# Patient Record
Sex: MALE | Race: WHITE | NOT HISPANIC OR LATINO | URBAN - METROPOLITAN AREA
[De-identification: names, ages, dates, MRNs, and addresses within clinical notes are randomized per-mention and may not be internally consistent; named-entity substitution may affect disease eponyms.]

---

## 2017-04-07 ENCOUNTER — INPATIENT (INPATIENT)
Facility: HOSPITAL | Age: 62
LOS: 5 days | Discharge: HOME | DRG: 896 | End: 2017-04-13
Attending: PSYCHIATRY & NEUROLOGY | Admitting: PSYCHIATRY & NEUROLOGY
Payer: COMMERCIAL

## 2017-04-07 VITALS
TEMPERATURE: 99 F | DIASTOLIC BLOOD PRESSURE: 94 MMHG | OXYGEN SATURATION: 99 % | RESPIRATION RATE: 18 BRPM | SYSTOLIC BLOOD PRESSURE: 195 MMHG | HEART RATE: 80 BPM

## 2017-04-07 DIAGNOSIS — Z98.890 OTHER SPECIFIED POSTPROCEDURAL STATES: Chronic | ICD-10-CM

## 2017-04-07 DIAGNOSIS — G92 TOXIC ENCEPHALOPATHY: ICD-10-CM

## 2017-04-07 DIAGNOSIS — I63.9 CEREBRAL INFARCTION, UNSPECIFIED: ICD-10-CM

## 2017-04-07 LAB
BLD GP AB SCN SERPL QL: NEGATIVE — SIGNIFICANT CHANGE UP
RH IG SCN BLD-IMP: POSITIVE — SIGNIFICANT CHANGE UP

## 2017-04-07 PROCEDURE — 70450 CT HEAD/BRAIN W/O DYE: CPT | Mod: 26,59

## 2017-04-07 PROCEDURE — 93010 ELECTROCARDIOGRAM REPORT: CPT

## 2017-04-07 PROCEDURE — 70498 CT ANGIOGRAPHY NECK: CPT | Mod: 26

## 2017-04-07 PROCEDURE — 70496 CT ANGIOGRAPHY HEAD: CPT | Mod: 26

## 2017-04-07 PROCEDURE — 99291 CRITICAL CARE FIRST HOUR: CPT | Mod: 25

## 2017-04-07 RX ORDER — ALTEPLASE 100 MG
8 KIT INTRAVENOUS ONCE
Qty: 0 | Refills: 0 | Status: COMPLETED | OUTPATIENT
Start: 2017-04-07 | End: 2017-04-07

## 2017-04-07 RX ORDER — AMLODIPINE BESYLATE 2.5 MG/1
0 TABLET ORAL
Qty: 0 | Refills: 0 | COMMUNITY

## 2017-04-07 RX ORDER — ALTEPLASE 100 MG
70 KIT INTRAVENOUS ONCE
Qty: 0 | Refills: 0 | Status: COMPLETED | OUTPATIENT
Start: 2017-04-07 | End: 2017-04-07

## 2017-04-07 RX ORDER — ATORVASTATIN CALCIUM 80 MG/1
80 TABLET, FILM COATED ORAL AT BEDTIME
Qty: 0 | Refills: 0 | Status: DISCONTINUED | OUTPATIENT
Start: 2017-04-07 | End: 2017-04-13

## 2017-04-07 RX ADMIN — ALTEPLASE 480 MILLIGRAM(S): KIT at 18:48

## 2017-04-07 RX ADMIN — ALTEPLASE 70 MILLIGRAM(S): KIT at 18:52

## 2017-04-07 RX ADMIN — ATORVASTATIN CALCIUM 80 MILLIGRAM(S): 80 TABLET, FILM COATED ORAL at 23:00

## 2017-04-07 NOTE — H&P ADULT - PROBLEM SELECTOR PLAN 5
- Holding heparin subQ. Consider starting it 24hrs post tPA.  - SCD's for DVT prophylaxis.  - Pantoprazole for gastric stress ulcer prophylaxis.

## 2017-04-07 NOTE — ED PROVIDER NOTE - NIH STROKE SCALE: 9. BEST LANGUAGE
(0) No aphasia; normal (1) Mild-to-moderate aphasia; some obvious loss of fluency or facility of comprehension, w/o significant limitation on ideas expressed or form of expression. Reduction of speech and/or comprehension, however, makes conversation about provided material difficult or impossible.  For example, in conversation about provided materials, examiner can identify picture or naming card content from patient's response.

## 2017-04-07 NOTE — H&P ADULT - FAMILY HISTORY
Mother  Still living? Unknown  Family history of diabetes mellitus in mother, Age at diagnosis: Age Unknown  Family history of asthma, Age at diagnosis: Age Unknown     Sibling  Still living? Unknown  Family history of asthma, Age at diagnosis: Age Unknown

## 2017-04-07 NOTE — CONSULT NOTE ADULT - ASSESSMENT
60yo M hx of HTN presents with dizziness, confusion and possible slurred speech, found to have word finding difficulty and mild right facial droop, NIHSS 4, CT head negative, s/p tPA administered at 18:43.

## 2017-04-07 NOTE — ED ADULT NURSE NOTE - CAS EDN DISCHARGE ASSESSMENT
Patient baseline mental status/Alert and oriented to person, place and time/Awake/Dressing clean and dry

## 2017-04-07 NOTE — H&P ADULT - PMH
Arthritis    Chronic gout without tophus, unspecified cause, unspecified site    Glaucoma, unspecified glaucoma, unspecified laterality    Hypertension

## 2017-04-07 NOTE — ED ADULT TRIAGE NOTE - CHIEF COMPLAINT QUOTE
Patient BIBA for  dizziness , slurred speech with disorientation  started 30 mins ago while at work today .  Denies any chest pain nor sob .

## 2017-04-07 NOTE — CONSULT NOTE ADULT - SUBJECTIVE AND OBJECTIVE BOX
Stroke Code Consult Note    Last known well time/Time of onset of symptoms: ~5PM    HPI: 60yo M hx of HTN presents with dizziness, confusion and possible slurred speech. 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 He was sat down and his co-workers called 911. He said he felt lightheaded for about 15 minutes but then resolved. Once in ED, reports his symptoms have resolved but does recognize that he still feels somewhat off but unable to further describe.       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 No nausea, vomiting or hematemesis; No diarrhea or constipation. Nohematochezia.  Genitourinary: No dysuria, frequency, hematuria or incontinence  Neurological: As per HPI  Skin: No itching, burning, rashes or lesions   Endocrine: No heat or cold intolerance; No hair loss  Musculoskeletal: No joint pain or swelling; No muscle, back or extremity pain  Psychiatric: No depression, anxiety, mood swings or difficulty sleeping  Heme/Lymph: No easy bruising or bleeding gums    MEDICATIONS  (STANDING):     MEDICATIONS  (PRN):      Allergies    No Known Allergies    Intolerances    Vital Signs Last 24 Hrs  T(C): 37.2, Max: 37.2 (04-07 @ 17:52)  T(F): 99, Max: 99 (04-07 @ 17:52)  HR: 76 (75 - 87)  BP: 142/80 (142/80 - 195/94)  BP(mean): 104 (104 - 131)  RR: 16 (14 - 18)  SpO2: 96% (96% - 99%)    Neurologic Exam:  Gen: No acute distress, well-nourished  CV: carotid arteries- no bruits, reg rate and rhythm, no murmurs  Neuro:  Mental status: Awake, alert and oriented x3.  Recent and remote memory intact.  Naming and repetition with some difficulty. Comprehension intact.  Attention/concentration intact.  No dysarthria.  Fund of knowledge appropriate.    Cranial nerves: Fundoscopic exam demonstrated no abnormalities, pupils equally round and reactive to light, visual fields full, no nystagmus, extraocular muscles intact, V1 through V3 intact bilaterally and symmetric, hearing intact to finger rub, palate elevation symmetric, tongue was midline, sternocleidomastoid/shoulder shrug strength bilaterally 5/5.  Mild right lower facial droop. facial droop  Motor:  Normal bulk and tone, strength 5/5 in bilateral upper and lower extremities.   strength 5/5.  Rapid alternating movements intact and symmetric.   Sensation: Intact to light touch, proprioception, vibration, temperature, pinprick.  No neglect.   Coordination: No dysmetria on finger-to-nose and heel-to-shin.  No clumsiness.  Reflexes: absent Babinski bilaterally  Gait: unable to assess but noted to have some leaning to left side when sitting forward     NIHSS: 4 (word finding, right facial droop)    Blood glucose (fingerstick): 96     Labs:                        14.8   7.1   )-----------( 210      ( 07 Apr 2017 18:00 )             42.4     04-07    139  |  103  |  19  ----------------------------<  105<H>  4.0   |  27  |  0.85    Ca    9.1      07 Apr 2017 18:00    TPro  8.1  /  Alb  4.4  /  TBili  1.0  /  DBili  x   /  AST  23  /  ALT  67<H>  /  AlkPhos  136<H>  04-07    PT/INR - ( 07 Apr 2017 18:00 )   PT: 10.4 sec;   INR: 0.94          PTT - ( 07 Apr 2017 18:00 )  PTT:36.7 sec      Radiology and Additional Studies:  CT head no contrast: No acute intracranial abnormality. Specifically, no acute intracranial   hemorrhage, mass effect or recent transcortical or territorial   infarction. Minimal small vessel ischemic disease.    Findings were discussed with Dr. Graham of the ED at 6:08 PM on   4/7/2017. Images were acquired at 5:59 PM.    CT head and neck with IV contrast: No hemodynamically significant carotid or vertebral artery stenosis. Left   vertebral artery is dominant.    IV-tPA (Y/N):                    Yes               Bolus time: 18:41  Reason IV-tPA not given: Stroke Code Consult Note    Last known well time/Time of onset of symptoms: ~5PM    HPI: 62yo M hx of HTN presents with dizziness, confusion and possible slurred speech. 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 He was sat down and his co-workers called 911. He said he felt lightheaded for about 15 minutes but then resolved. Once in ED, reports his symptoms have resolved but does recognize that he still feels somewhat off but unable to further describe. Stroke code called for concerning symptoms prior to arrival. Exam significant for patient with difficulty with word finding and mild right facial droop. Stat CT head without evidence of hemorrhage and CT head and neck with IV contrast without any evidence of obstruction. Decision made to administer tPA - bolus given at 18:41PM and drip started at 18:43 PM.       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 No nausea, vomiting or hematemesis; No diarrhea or constipation. Nohematochezia.  Genitourinary: No dysuria, frequency, hematuria or incontinence  Neurological: As per HPI  Skin: No itching, burning, rashes or lesions   Endocrine: No heat or cold intolerance; No hair loss  Musculoskeletal: No joint pain or swelling; No muscle, back or extremity pain  Psychiatric: No depression, anxiety, mood swings or difficulty sleeping  Heme/Lymph: No easy bruising or bleeding gums    MEDICATIONS  (STANDING):     MEDICATIONS  (PRN):      Allergies    No Known Allergies    Intolerances    Vital Signs Last 24 Hrs  T(C): 37.2, Max: 37.2 (04-07 @ 17:52)  T(F): 99, Max: 99 (04-07 @ 17:52)  HR: 76 (75 - 87)  BP: 142/80 (142/80 - 195/94)  BP(mean): 104 (104 - 131)  RR: 16 (14 - 18)  SpO2: 96% (96% - 99%)    Neurologic Exam:  Gen: No acute distress, well-nourished  CV: carotid arteries- no bruits, reg rate and rhythm, no murmurs  Neuro:  Mental status: Awake, alert and oriented x3.  Recent and remote memory intact.  Naming and repetition with some difficulty. Comprehension intact.  Attention/concentration intact.  No dysarthria.  Fund of knowledge appropriate.    Cranial nerves: Fundoscopic exam demonstrated no abnormalities, pupils equally round and reactive to light, visual fields full, no nystagmus, extraocular muscles intact, V1 through V3 intact bilaterally and symmetric, hearing intact to finger rub, palate elevation symmetric, tongue was midline, sternocleidomastoid/shoulder shrug strength bilaterally 5/5.  Mild right lower facial droop. facial droop  Motor:  Normal bulk and tone, strength 5/5 in bilateral upper and lower extremities.   strength 5/5.  Rapid alternating movements intact and symmetric.   Sensation: Intact to light touch, proprioception, vibration, temperature, pinprick.  No neglect.   Coordination: No dysmetria on finger-to-nose and heel-to-shin.  No clumsiness.  Reflexes: absent Babinski bilaterally  Gait: unable to assess but noted to have some leaning to left side when sitting forward     NIHSS: 4 (word finding, right facial droop)    Blood glucose (fingerstick): 96     Labs:                        14.8   7.1   )-----------( 210      ( 07 Apr 2017 18:00 )             42.4     04-07    139  |  103  |  19  ----------------------------<  105<H>  4.0   |  27  |  0.85    Ca    9.1      07 Apr 2017 18:00    TPro  8.1  /  Alb  4.4  /  TBili  1.0  /  DBili  x   /  AST  23  /  ALT  67<H>  /  AlkPhos  136<H>  04-07    PT/INR - ( 07 Apr 2017 18:00 )   PT: 10.4 sec;   INR: 0.94          PTT - ( 07 Apr 2017 18:00 )  PTT:36.7 sec      Radiology and Additional Studies:  CT head no contrast: No acute intracranial abnormality. Specifically, no acute intracranial   hemorrhage, mass effect or recent transcortical or territorial   infarction. Minimal small vessel ischemic disease.    Findings were discussed with Dr. Graham of the ED at 6:08 PM on   4/7/2017. Images were acquired at 5:59 PM.    CT head and neck with IV contrast: No hemodynamically significant carotid or vertebral artery stenosis. Left   vertebral artery is dominant.    IV-tPA (Y/N):                    Yes               Bolus time: 18:41  Reason IV-tPA not given:

## 2017-04-07 NOTE — ED PROVIDER NOTE - MEDICAL DECISION MAKING DETAILS
episode of slurred speech and dizziness that resolved. Pt alert, oriented on arrival with no deficits, episode of slurred speech lasted for < 15 min. Possible small vaso occlusive event, too short for TIA. Stroke code called, CT non contrast negative, will CTA. Dr. Beltran on board. episode of slurred speech and dizziness that resolved. Pt alert, oriented on arrival with no deficits, episode of slurred speech lasted for < 15 min. Possible small vaso occlusive event, too short for TIA. Stroke code called, CT non contrast negative, will CTA.     After being examined by Dr. Beltran it was noted that pt was having mild pronator drift episode of slurred speech and dizziness that resolved. Pt alert, oriented on arrival with no deficits, episode of slurred speech lasted for < 15 min. Possible small vaso occlusive event, too short for TIA. Stroke code called, CT non contrast negative, will CTA.     After being examined by Dr. Beltran it was noted that pt was having mild pronator drift as well as word finding difficulty. Decision was made to give tPA with NIH scale of 4. Door to treatment approximately 45 min.

## 2017-04-07 NOTE — H&P ADULT - ASSESSMENT
62yo M with PMHx of HTN, glaucoma, gout, and RA, presents to the ED BIBEMS after presenting with slurred speech and confusion at 5pm, now s/p tPA at 6:41pm.

## 2017-04-07 NOTE — H&P ADULT - HISTORY OF PRESENT ILLNESS
60yo M with PMHx of HTN, glaucoma, gout, and RA, presents to the ED BIBEMS after co-workers noticed that patient was having slurred speech, and looked confused today at 5pm. As per patient, he did not notice any change in him, and was comfortable at work when he suddenly felt something was "off", although he can not specify exactly what, so he took a break from his duties, sat down, and that's when his coworkers noticed the changes in him, and called EMS. Patient states he did not feel any weakness, lightheadedness, numbness, trouble with his vision, or noticed any slurred speech.     Before arriving to the ED, stroke code called. In the ED patient's exam was significant for difficulty with word finding, and mild right facial droop. CT was done stat and showed no intracranial bleeding. 60yo M with PMHx of HTN, glaucoma, gout, and RA, presents to the ED BIBEMS after co-workers noticed that patient was having slurred speech, and looked confused today at 5pm. As per patient, he did not notice any change in him, and was comfortable at work when he suddenly felt something was "off", although he can not specify exactly what, so he took a break from his duties, sat down, and that's when his coworkers noticed the changes in him, and called EMS. Patient states he did not feel any weakness, lightheadedness, numbness, trouble with his vision, or noticed any slurred speech.     Before arriving to the ED, stroke code called. In the ED patient's exam was significant for difficulty with word finding, and mild right facial droop. CT was done stat and showed no intracranial bleeding, and at 18:41PM tPA bolus was given and drip started at 18:43 PM.     In the ED, vital signs were:  - BP: 195/94mmHg  - HR: 80  - RR: 18  - O2Sat: 99% on RA  - Temp: 99

## 2017-04-07 NOTE — H&P ADULT - NSHPLABSRESULTS_GEN_ALL_CORE
LABS:                         14.8   7.1   )-----------( 210      ( 07 Apr 2017 18:00 )             42.4     04-07    139  |  103  |  19  ----------------------------<  105<H>  4.0   |  27  |  0.85    Ca    9.1      07 Apr 2017 18:00    TPro  8.1  /  Alb  4.4  /  TBili  1.0  /  DBili  x   /  AST  23  /  ALT  67<H>  /  AlkPhos  136<H>  04-07    PT/INR - ( 07 Apr 2017 18:00 )   PT: 10.4 sec;   INR: 0.94          PTT - ( 07 Apr 2017 18:00 )  PTT:36.7 sec    CARDIAC MARKERS ( 07 Apr 2017 18:00 )  <0.015 ng/mL / x     / 109 U/L / x     / <1.0 ng/mL            RADIOLOGY, EKG & ADDITIONAL TESTS: Reviewed.

## 2017-04-07 NOTE — ED PROVIDER NOTE - CRITICAL CARE PROVIDED
consult w/ pt's family directly relating to pts condition/direct patient care (not related to procedure)/consultation with other physicians/conducted a detailed discussion of DNR status/documentation/interpretation of diagnostic studies/additional history taking

## 2017-04-07 NOTE — ED ADULT NURSE NOTE - OBJECTIVE STATEMENT
Pt received aox3 via EMS at 550pm; EMS stated pt had onset of slurred speech witnessed by coworkers at 5pm. Pt was abida in as notification, denies any symptoms, denies pain, denies dizziness. Per pt he states he felt dizzy earlier but not currently. Pt was escorted to CT on cardiac monitoring with RN supervision. IV placed and labs drawn and sent. At this time, 617pm, NIH stroke scale in progress by stroke team.

## 2017-04-07 NOTE — STROKE CODE NOTE - NIH STROKE SCALE: 9. BEST LANGUAGE, QM
(1) Mild-to-moderate aphasia; some obvious loss of fluency or facility of comprehension, without significant limitation on ideas expressed or form of expression. Reduction of speech and/or comprehension, however, makes conversation about provided material difficult or impossible. For example, in conversation about provided materials, examiner can identify picture or naming card content from patient's response.

## 2017-04-07 NOTE — CONSULT NOTE ADULT - ATTENDING COMMENTS
Patient evaluated by myself and stroke resident. Initially came in with  aphasia, right facial droop and slurred speech that resolved soon after arrival [facility] ED physician exam. When I examine the patient he had a recurrence of his language defect, slurred speech, mild right central facial droop and a right pronator drift. Patient was explained the risks and benefits of IV TPA and based on his weight appropriate dose was injected (10% of total dose) followed by remainder of dose given over one hour by IV.  patient was admitted to the ICU bed for monitoring post tpa.    I subsequently found more information from patient's wife who tells me that patient is had progressive decline in his cognitive function with specific difficulties with finding his way home or orienting himself correctly when he leaves a restaurant or a bar. In addition, patient has a long time history of EtOH. Patient reports that his last drink was one week ago as wife confirms this.

## 2017-04-07 NOTE — H&P ADULT - NSHPPHYSICALEXAM_GEN_ALL_CORE
.  VITAL SIGNS:  T(C): 36.8, Max: 37.2 (04-07 @ 17:52)  T(F): 98.2, Max: 99 (04-07 @ 17:52)  HR: 70 (70 - 87)  BP: 156/88 (135/80 - 195/94)  BP(mean): 114 (98 - 131)  RR: 17 (14 - 20)  SpO2: 93% (92% - 99%)  Wt(kg): --    PHYSICAL EXAM:    Constitutional: WDWN resting comfortably in bed; NAD  Head: NC/AT  Eyes: PERRL, EOMI, clear conjunctiva  ENT: no nasal discharge; uvula midline, no oropharyngeal erythema or exudates; MMM  Neck: supple; no JVD or thyromegaly  Respiratory: CTA B/L; no W/R/R, no retractions  Cardiac: +S1/S2; RRR; no M/R/G; PMI non-displaced  Gastrointestinal: soft, NT/ND; no rebound or guarding; +BSx4  Genitourinary: normal external genitalia  Back: spine midline, no bony tenderness or step-offs; no CVAT B/L  Extremities: WWP, no clubbing or cyanosis; no peripheral edema  Musculoskeletal: NROM x4; no joint swelling, tenderness or erythema  Vascular: 2+ radial, femoral, DP/PT pulses B/L  Dermatologic: skin warm, dry and intact; no rashes, wounds, or scars  Lymphatic: no submandibular or cervical LAD  Neurologic: AAOx3; CNII-XII grossly intact; no focal deficits  Psychiatric: affect and characteristics of appearance, verbalizations, behaviors are appropriate .  VITAL SIGNS:  T(C): 36.8, Max: 37.2 (04-07 @ 17:52)  T(F): 98.2, Max: 99 (04-07 @ 17:52)  HR: 70 (70 - 87)  BP: 156/88 (135/80 - 195/94)  BP(mean): 114 (98 - 131)  RR: 17 (14 - 20)  SpO2: 93% (92% - 99%)  Wt(kg): --    PHYSICAL EXAM:    Constitutional: WDWN resting comfortably in bed. NAD  Head: NC/AT  Eyes: PERRL, EOMI, clear conjunctiva  ENT: no nasal discharge. MMM  Neck: supple  Respiratory: CTA B/L, no W/R/R, no retractions  Cardiac: +S1/S2, RRR, no M/R/G  Gastrointestinal: normoactive bowel sounds, obese abdomen, soft, non tender, no rebound or guarding  Extremities: WWP, no clubbing, cyanosis, or peripheral edema  Musculoskeletal: NROM x4, no joint swelling, tenderness or erythema  Vascular: 2+ radial, and DP pulses B/L  Dermatologic: skin warm, dry and intact, no rashes, wounds, or scars  Neurologic: AAOx3, mild right facial droop, CN otherwise intact  Psychiatric: affect and characteristics of appearance, verbalizations, behaviors are appropriate

## 2017-04-07 NOTE — ED PROVIDER NOTE - NIH STROKE SCALE: 5B. MOTOR ARM, RIGHT, QM
(0) No drift; limb holds 90 (or 45) degrees for full 10 secs (2) Some effort against gravity; limb cannot get to or maintain (if cued) 90 (or 45) degrees, drifts down to bed, but has some effort against gravity

## 2017-04-07 NOTE — H&P ADULT - NSHPSOCIALHISTORY_GEN_ALL_CORE
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  - Never smoker.  - No recreational drug use.  - Hx of heavy alcohol abuse over 20yrs ago, currently drinks 7-9 drinks of vodka + OJ only on saturdays (history confirmed by wife), has never withdrawal and has skipped his Saturday drinks before without any complications/withdrawal symptoms. 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  - Never smoker.  - No recreational drug use.  - Hx of heavy alcohol abuse over 20yrs ago, currently drinks 7-9 drinks of vodka + OJ only on Saturdays (history confirmed by wife), has never withdrawal and has skipped his Saturday drinks before without any complications/withdrawal symptoms.

## 2017-04-07 NOTE — H&P ADULT - PROBLEM SELECTOR PLAN 1
Patient evaluated by stroke team, CT was done stat and showed no intracranial hemorrhage, and at 18:41PM tPA bolus was given and drip started at 18:43 PM.   - Patient to stay in bed for 24hrs.  - Avoid blood draws for 24hrs post tPA.  - Repeat CT head non con 24hrs post tPA.  - If pt complains of headaches or has changes in neuro exam, do stat CT.  - Holding antiplatelet and HSQ for 24 hours  - Echo with bubble ordered, f/u results  - NPO for 12hrs post tPA, and pending dysphagia screen   - PT/OT/Speech consults for tomorrow   - Stroke team to follow.

## 2017-04-07 NOTE — CONSULT NOTE ADULT - PROBLEM SELECTOR RECOMMENDATION 9
-repeat head imaging tomorrow, 24 hours after tPA administration, with either CT head w/o contrast or MRI head w/o contrast to r/o bleed  -bedrest x 24 hours, avoid needle sticks in first 24 hours  -holding antiplatelet and HSQ for 24 hours  -obtain Echo   -lipid panel, HgA1c, TSH to assess for risk factors of stroke   -SCDs for DVT prophylaxis  -dysphagia screen   -PT/OT/Speech consults for tomorrow   - Stroke team to follow

## 2017-04-08 DIAGNOSIS — R63.8 OTHER SYMPTOMS AND SIGNS CONCERNING FOOD AND FLUID INTAKE: ICD-10-CM

## 2017-04-08 DIAGNOSIS — I10 ESSENTIAL (PRIMARY) HYPERTENSION: ICD-10-CM

## 2017-04-08 DIAGNOSIS — M1A.9XX0 CHRONIC GOUT, UNSPECIFIED, WITHOUT TOPHUS (TOPHI): ICD-10-CM

## 2017-04-08 DIAGNOSIS — Z41.8 ENCOUNTER FOR OTHER PROCEDURES FOR PURPOSES OTHER THAN REMEDYING HEALTH STATE: ICD-10-CM

## 2017-04-08 DIAGNOSIS — H40.9 UNSPECIFIED GLAUCOMA: ICD-10-CM

## 2017-04-08 LAB
ALBUMIN SERPL ELPH-MCNC: 3.9 G/DL — SIGNIFICANT CHANGE UP (ref 3.4–5)
ALP SERPL-CCNC: 134 U/L — HIGH (ref 40–120)
ALT FLD-CCNC: 53 U/L — HIGH (ref 12–42)
ANION GAP SERPL CALC-SCNC: 8 MMOL/L — LOW (ref 9–16)
AST SERPL-CCNC: 16 U/L — SIGNIFICANT CHANGE UP (ref 15–37)
BILIRUB SERPL-MCNC: 1.5 MG/DL — HIGH (ref 0.2–1.2)
BUN SERPL-MCNC: 13 MG/DL — SIGNIFICANT CHANGE UP (ref 7–23)
CALCIUM SERPL-MCNC: 9 MG/DL — SIGNIFICANT CHANGE UP (ref 8.5–10.5)
CHLORIDE SERPL-SCNC: 105 MMOL/L — SIGNIFICANT CHANGE UP (ref 96–108)
CO2 SERPL-SCNC: 27 MMOL/L — SIGNIFICANT CHANGE UP (ref 22–31)
CREAT SERPL-MCNC: 0.73 MG/DL — SIGNIFICANT CHANGE UP (ref 0.5–1.3)
GGT SERPL-CCNC: 64 U/L — SIGNIFICANT CHANGE UP (ref 15–85)
GLUCOSE SERPL-MCNC: 97 MG/DL — SIGNIFICANT CHANGE UP (ref 70–99)
HCT VFR BLD CALC: 43.5 % — SIGNIFICANT CHANGE UP (ref 39–50)
HGB BLD-MCNC: 15.5 G/DL — SIGNIFICANT CHANGE UP (ref 13–17)
MCHC RBC-ENTMCNC: 31.4 PG — SIGNIFICANT CHANGE UP (ref 27–34)
MCHC RBC-ENTMCNC: 35.6 G/DL — SIGNIFICANT CHANGE UP (ref 32–36)
MCV RBC AUTO: 88.2 FL — SIGNIFICANT CHANGE UP (ref 80–100)
PLATELET # BLD AUTO: 190 K/UL — SIGNIFICANT CHANGE UP (ref 150–400)
POTASSIUM SERPL-MCNC: 3.8 MMOL/L — SIGNIFICANT CHANGE UP (ref 3.5–5.3)
POTASSIUM SERPL-SCNC: 3.8 MMOL/L — SIGNIFICANT CHANGE UP (ref 3.5–5.3)
PROT SERPL-MCNC: 7.5 G/DL — SIGNIFICANT CHANGE UP (ref 6.4–8.2)
RBC # BLD: 4.93 M/UL — SIGNIFICANT CHANGE UP (ref 4.2–5.8)
RBC # FLD: 13.2 % — SIGNIFICANT CHANGE UP (ref 10.3–16.9)
SODIUM SERPL-SCNC: 140 MMOL/L — SIGNIFICANT CHANGE UP (ref 135–145)
WBC # BLD: 7 K/UL — SIGNIFICANT CHANGE UP (ref 3.8–10.5)
WBC # FLD AUTO: 7 K/UL — SIGNIFICANT CHANGE UP (ref 3.8–10.5)

## 2017-04-08 PROCEDURE — 99232 SBSQ HOSP IP/OBS MODERATE 35: CPT | Mod: GC

## 2017-04-08 PROCEDURE — 70450 CT HEAD/BRAIN W/O DYE: CPT | Mod: 26

## 2017-04-08 PROCEDURE — 93306 TTE W/DOPPLER COMPLETE: CPT | Mod: 26

## 2017-04-08 RX ORDER — THIAMINE MONONITRATE (VIT B1) 100 MG
500 TABLET ORAL EVERY 8 HOURS
Qty: 0 | Refills: 0 | Status: DISCONTINUED | OUTPATIENT
Start: 2017-04-08 | End: 2017-04-11

## 2017-04-08 RX ORDER — ALLOPURINOL 300 MG
300 TABLET ORAL DAILY
Qty: 0 | Refills: 0 | Status: DISCONTINUED | OUTPATIENT
Start: 2017-04-08 | End: 2017-04-13

## 2017-04-08 RX ORDER — PANTOPRAZOLE SODIUM 20 MG/1
40 TABLET, DELAYED RELEASE ORAL
Qty: 0 | Refills: 0 | Status: DISCONTINUED | OUTPATIENT
Start: 2017-04-08 | End: 2017-04-13

## 2017-04-08 RX ORDER — COLCHICINE 0.6 MG
0.6 TABLET ORAL DAILY
Qty: 0 | Refills: 0 | Status: DISCONTINUED | OUTPATIENT
Start: 2017-04-08 | End: 2017-04-13

## 2017-04-08 RX ORDER — POTASSIUM CHLORIDE 20 MEQ
20 PACKET (EA) ORAL ONCE
Qty: 0 | Refills: 0 | Status: COMPLETED | OUTPATIENT
Start: 2017-04-08 | End: 2017-04-08

## 2017-04-08 RX ADMIN — Medication 0.6 MILLIGRAM(S): at 19:24

## 2017-04-08 RX ADMIN — Medication 105 MILLIGRAM(S): at 22:03

## 2017-04-08 RX ADMIN — ATORVASTATIN CALCIUM 80 MILLIGRAM(S): 80 TABLET, FILM COATED ORAL at 22:03

## 2017-04-08 RX ADMIN — PANTOPRAZOLE SODIUM 40 MILLIGRAM(S): 20 TABLET, DELAYED RELEASE ORAL at 06:01

## 2017-04-08 RX ADMIN — Medication 20 MILLIEQUIVALENT(S): at 19:23

## 2017-04-08 RX ADMIN — Medication 300 MILLIGRAM(S): at 19:24

## 2017-04-08 NOTE — PROGRESS NOTE ADULT - SUBJECTIVE AND OBJECTIVE BOX
PGY-1 PROGRESS NOTE    O/N EVENTS: Admitted to MICU. Received TPA at 6:41pm on 4/7. KYMBERLY.  S: Denies dizziness, SOB, chest pain, heart palpitations, N/V/D, new weakness/numbness/tingling. Improved speech this AM per pt.  O: See below for vitals, PE and labs.    Vital Signs- Last 24 Hrs:  T(F): 98, Max: 98.2 (04-07 @ 22:00)  HR: 78 (60 - 87)  BP: 125/91 (115/75 - 181/91)  BP(mean): 106 (85 - 131)  RR: 15 (7 - 20)  SpO2: 96% (91% - 99%)    I&O's Summary  I & Os for 24h ending 08 Apr 2017 07:00  =============================================  IN: 0 ml / OUT: 1100 ml / NET: -1100 ml    I & Os for current day (as of 08 Apr 2017 18:16)  =============================================  IN: 0 ml / OUT: 400 ml / NET: -400 ml    CAPILLARY BLOOD GLUCOSE  96 (08 Apr 2017 16:00)     PE:     Labs:              15.5   7.0   )-----------( 190                43.5     140  |  105  |  13  ----------------------------<  97  3.8   |  27  |  0.73    Ca    9.0        TPro  7.5  /  Alb  3.9  /  TBili  1.5  /  DBili  x   /  AST  16  /  ALT  53  /  AlkPhos  134    MEDICATIONS:  atorvastatin 80milliGRAM(s) Oral at bedtime  pantoprazole    Tablet 40milliGRAM(s) Oral before breakfast  colchicine 0.6milliGRAM(s) Oral daily  allopurinol 300milliGRAM(s) Oral daily  thiamine IVPB 500milliGRAM(s) IV Intermittent every 8 hours  potassium chloride   Powder 20milliEquivalent(s) Oral once PGY-1 PROGRESS NOTE    O/N EVENTS: Admitted to MICU. Received TPA at 6:41pm on 4/7. KYMBERLY.  S: Denies dizziness, SOB, chest pain, heart palpitations, N/V/D, new weakness/numbness/tingling. Improved speech this AM per pt.  O: See below for vitals, PE and labs.    Vital Signs- Last 24 Hrs:  T(F): 98, Max: 98.2 (04-07 @ 22:00)  HR: 78 (60 - 87)  BP: 125/91 (115/75 - 181/91)  BP(mean): 106 (85 - 131)  RR: 15 (7 - 20)  SpO2: 96% (91% - 99%)    I&O's Summary  I & Os for 24h ending 08 Apr 2017 07:00  =============================================  IN: 0 ml / OUT: 1100 ml / NET: -1100 ml    I & Os for current day (as of 08 Apr 2017 18:16)  =============================================  IN: 0 ml / OUT: 400 ml / NET: -400 ml    CAPILLARY BLOOD GLUCOSE  96 (08 Apr 2017 16:00)     PE: General: NAD, comfortable lying in bed, A&Ox3  HEENT: PERRL, EOMI, clear conjunctiva, MMM  Respiratory: CTA B/L, no W/R/R, no retractions  Cardiac: normal +S1/S2, RRR, no M/R/G  GI: soft, NTND, bowel sounds normoactive  Ext: No peripheral edema  MSK: NROM x4, no joint swelling, tenderness or erythema  Neurologic: AAOx3, no discernable R facial droop, CN intact; b/l UE and LE motor strength 5/5, sensory intact b/l, no dysdiadokinesis or dysmetria; reflexes intact, no babinski    Labs:              15.5   7.0   )-----------( 190                43.5     140  |  105  |  13  ----------------------------<  97  3.8   |  27  |  0.73    Ca    9.0        TPro  7.5  /  Alb  3.9  /  TBili  1.5  /  DBili  x   /  AST  16  /  ALT  53  /  AlkPhos  134    MEDICATIONS:  atorvastatin 80milliGRAM(s) Oral at bedtime  pantoprazole    Tablet 40milliGRAM(s) Oral before breakfast  colchicine 0.6milliGRAM(s) Oral daily  allopurinol 300milliGRAM(s) Oral daily  thiamine IVPB 500milliGRAM(s) IV Intermittent every 8 hours  potassium chloride   Powder 20milliEquivalent(s) Oral once PGY-1 PROGRESS NOTE    O/N EVENTS: Admitted to MICU. Received TPA at 6:41pm on 4/7. KYMBERLY.  S: Denies dizziness, SOB, chest pain, heart palpitations, N/V/D, new weakness/numbness/tingling. Improved speech this AM per pt.  O: See below for vitals, PE and labs.    Vital Signs- Last 24 Hrs:  T(F): 98, Max: 98.2 (04-07 @ 22:00)  HR: 78 (60 - 87)  BP: 125/91 (115/75 - 181/91)  BP(mean): 106 (85 - 131)  RR: 15 (7 - 20)  SpO2: 96% (91% - 99%)    I&O's Summary  I & Os for 24h ending 08 Apr 2017 07:00  =============================================  IN: 0 ml / OUT: 1100 ml / NET: -1100 ml    I & Os for current day (as of 08 Apr 2017 18:16)  =============================================  IN: 0 ml / OUT: 400 ml / NET: -400 ml    CAPILLARY BLOOD GLUCOSE  96 (08 Apr 2017 16:00)     PE: General: NAD, comfortable lying in bed, A&Ox3  HEENT: PERRL, EOMI, clear conjunctiva, MMM  Respiratory: CTA B/L, no W/R/R, no retractions  Cardiac: normal +S1/S2, RRR, no M/R/G  GI: soft, NTND, bowel sounds normoactive  Ext: No peripheral edema  MSK: NROM x4, no joint swelling, tenderness or erythema  Neurologic: AAOx3, no discernable R facial droop, CN intact; b/l UE and LE motor strength 5/5, sensory intact b/l, no dysdiadokinesis or dysmetria; reflexes intact, no babinski  vasc: 2+ pulses    Labs:              15.5   7.0   )-----------( 190                43.5     140  |  105  |  13  ----------------------------<  97  3.8   |  27  |  0.73    Ca    9.0        TPro  7.5  /  Alb  3.9  /  TBili  1.5  /  DBili  x   /  AST  16  /  ALT  53  /  AlkPhos  134    MEDICATIONS:  atorvastatin 80milliGRAM(s) Oral at bedtime  pantoprazole    Tablet 40milliGRAM(s) Oral before breakfast  colchicine 0.6milliGRAM(s) Oral daily  allopurinol 300milliGRAM(s) Oral daily  thiamine IVPB 500milliGRAM(s) IV Intermittent every 8 hours  potassium chloride   Powder 20milliEquivalent(s) Oral once    60yo M with PMHx of HTN, glaucoma, gout, and RA, presents to the ED BIBEMS after presenting with slurred speech and confusion at 5pm, now s/p tPA at 6:41pm.    Problem/Plan - 1:  ·  Problem: Cerebrovascular accident (CVA), unspecified mechanism.  Plan: Patient evaluated by stroke team, CT was done stat and showed no intracranial hemorrhage, and at 18:41PM tPA bolus was given and drip started at 18:43 PM.   - Patient to stay in bed for 24hrs.  - Avoid blood draws for 24hrs post tPA.  - Repeat CT head non con 24hrs post tPA.  - If pt complains of headaches or has changes in neuro exam, do stat CT.  - Holding antiplatelet and HSQ for 24 hours  - Echo with bubble ordered, f/u results  - NPO for 12hrs post tPA, and pending dysphagia screen   - PT/OT/Speech consults for tomorrow   - Stroke team to follow. Eventual MRI/EEG     Problem/Plan - 2:  ·  Problem: Essential hypertension.  Plan: Goal is SBP <180mmHg. Patient at goal.  - Holding home Nosvasc.     Problem/Plan - 3:  ·  Problem: Chronic gout without tophus, unspecified cause, unspecified site.  Plan: Patient taking Allopurinol and Colchicine,reorder when doses provided    Problem/Plan - 4:  ·  Problem: Glaucoma, unspecified glaucoma, unspecified laterality.  Plan: Wife to confirm the eyedrops patient uses in the AM, or call pharmacy tomorrow when it is open.     Problem/Plan - 5:  ·  Problem: Need for prophylactic measure.  Plan: - Holding heparin subQ. Consider starting it 24hrs post tPA.  - SCD's for DVT prophylaxis.  - Pantoprazole for gastric stress ulcer prophylaxis.     Problem/Plan - 6:  Problem: Nutrition, metabolism, and development symptoms. Plan: eval swallowing. PGY-1 PROGRESS NOTE    O/N EVENTS: Admitted to MICU. Received TPA at 6:41pm on 4/7. KYMBERLY.  S: Denies dizziness, SOB, chest pain, heart palpitations, N/V/D, new weakness/numbness/tingling. Improved speech this AM per pt.  O: See below for vitals, PE and labs.    Vital Signs- Last 24 Hrs:  T(F): 98, Max: 98.2 (04-07 @ 22:00)  HR: 78 (60 - 87)  BP: 125/91 (115/75 - 181/91)  BP(mean): 106 (85 - 131)  RR: 15 (7 - 20)  SpO2: 96% (91% - 99%)    I&O's Summary  I & Os for 24h ending 08 Apr 2017 07:00  =============================================  IN: 0 ml / OUT: 1100 ml / NET: -1100 ml    I & Os for current day (as of 08 Apr 2017 18:16)  =============================================  IN: 0 ml / OUT: 400 ml / NET: -400 ml    CAPILLARY BLOOD GLUCOSE  96 (08 Apr 2017 16:00)     PE: General: NAD, comfortable lying in bed, A&Ox3  HEENT: PERRL, EOMI, clear conjunctiva, MMM  Respiratory: CTA B/L, no W/R/R, no retractions  Cardiac: normal +S1/S2, RRR, no M/R/G  GI: soft, NTND, bowel sounds normoactive  Ext: No peripheral edema  MSK: NROM x4, no joint swelling, tenderness or erythema  Neurologic: AAOx3, no discernable R facial droop, CN intact; b/l UE and LE motor strength 5/5, sensory intact b/l, no dysdiadokinesis or dysmetria; reflexes intact, no babinski  vasc: 2+ pulses    Labs:              15.5   7.0   )-----------( 190                43.5     140  |  105  |  13  ----------------------------<  97  3.8   |  27  |  0.73    Ca    9.0        TPro  7.5  /  Alb  3.9  /  TBili  1.5  /  DBili  x   /  AST  16  /  ALT  53  /  AlkPhos  134    MEDICATIONS:  atorvastatin 80milliGRAM(s) Oral at bedtime  pantoprazole    Tablet 40milliGRAM(s) Oral before breakfast  colchicine 0.6milliGRAM(s) Oral daily  allopurinol 300milliGRAM(s) Oral daily  thiamine IVPB 500milliGRAM(s) IV Intermittent every 8 hours  potassium chloride   Powder 20milliEquivalent(s) Oral once    ASSESSMENT/PLAN: Pt is a 62yo M with PMH of HTN, glaucoma, gout, alcohol abuse and RA, presenting with slurred speech and confusion, found to have R facial droop and word-finding difficulty, now s/p tPA at 6:41pm on 4/7; admitted for post-tpa monitoring in MICU.    NEURO  #CVA: Pt presenting with slurred speech and confusion, found to have R facial droop and word-finding difficulty on exam. STAT CT head done, found to have no intracranial hemorrorhage; tPA given at 18:41PM on 4/7; admitted for post-tpa monitoring in MICU.  - Awaiting repeat CT head this evening (24 hours post-tPa).   - Neuro checks q1hr.  - Holding antiplatelet and HSQ for 24 hours pending results of repeat CT head.  - Echo with bubble performed, no PFO.  - Passed dysphagia screen - diet advanced, home po meds restarted.  - PT/OT/Speech consults, f/u recs.  - Concern given hx of alcohol abuse (provided by wife), that element of recent cognitive decline (out side of "neurological event" yesterday) could be 2/2 alcohol. Started on IV thiamine per neuro recs and ordered for MRI with sagittal cross-sections to fully evaluate mammary bodies.  -Goal SBP <180.   -HTN, elevated TGs, Hgb A1c 6.9%; TSH wnl.    CV  #HTN: goal SBP <180mmHg. Patient at goal. Holding home Nosvasc 5mg po qd.     Pulm: no acute issues  Renal: no acute issues  GI: no acute issues  Endo: Hgb A1c 6.9%, TSH wnl  ID: no acute issues  Heme: no acute issues    Rheum  #Gout: C/w home colchicine and allopurinol    FEN: DASH/TLC diet (passed dysphagia screening), replete lytes prn for K<4, Mg<2.  PPx: Holding HSQ and ASA pending repeat CT scan results. SCDs. Protonix.    FULL CODE  Dispo: MICU    peripherals

## 2017-04-09 LAB
ALBUMIN SERPL ELPH-MCNC: 3.6 G/DL — SIGNIFICANT CHANGE UP (ref 3.4–5)
ALP SERPL-CCNC: 117 U/L — SIGNIFICANT CHANGE UP (ref 40–120)
ALT FLD-CCNC: 44 U/L — HIGH (ref 12–42)
ANION GAP SERPL CALC-SCNC: 8 MMOL/L — LOW (ref 9–16)
AST SERPL-CCNC: 19 U/L — SIGNIFICANT CHANGE UP (ref 15–37)
BILIRUB SERPL-MCNC: 1.5 MG/DL — HIGH (ref 0.2–1.2)
BUN SERPL-MCNC: 18 MG/DL — SIGNIFICANT CHANGE UP (ref 7–23)
CALCIUM SERPL-MCNC: 8.8 MG/DL — SIGNIFICANT CHANGE UP (ref 8.5–10.5)
CHLORIDE SERPL-SCNC: 107 MMOL/L — SIGNIFICANT CHANGE UP (ref 96–108)
CO2 SERPL-SCNC: 26 MMOL/L — SIGNIFICANT CHANGE UP (ref 22–31)
CREAT SERPL-MCNC: 0.82 MG/DL — SIGNIFICANT CHANGE UP (ref 0.5–1.3)
GLUCOSE SERPL-MCNC: 128 MG/DL — HIGH (ref 70–99)
POTASSIUM SERPL-MCNC: 4.4 MMOL/L — SIGNIFICANT CHANGE UP (ref 3.5–5.3)
POTASSIUM SERPL-SCNC: 4.4 MMOL/L — SIGNIFICANT CHANGE UP (ref 3.5–5.3)
PROT SERPL-MCNC: 6.9 G/DL — SIGNIFICANT CHANGE UP (ref 6.4–8.2)
SODIUM SERPL-SCNC: 141 MMOL/L — SIGNIFICANT CHANGE UP (ref 135–145)

## 2017-04-09 PROCEDURE — 99233 SBSQ HOSP IP/OBS HIGH 50: CPT | Mod: GC

## 2017-04-09 RX ORDER — HEPARIN SODIUM 5000 [USP'U]/ML
5000 INJECTION INTRAVENOUS; SUBCUTANEOUS EVERY 8 HOURS
Qty: 0 | Refills: 0 | Status: DISCONTINUED | OUTPATIENT
Start: 2017-04-09 | End: 2017-04-13

## 2017-04-09 RX ORDER — QUETIAPINE FUMARATE 200 MG/1
25 TABLET, FILM COATED ORAL ONCE
Qty: 0 | Refills: 0 | Status: COMPLETED | OUTPATIENT
Start: 2017-04-09 | End: 2017-04-09

## 2017-04-09 RX ORDER — QUETIAPINE FUMARATE 200 MG/1
25 TABLET, FILM COATED ORAL AT BEDTIME
Qty: 0 | Refills: 0 | Status: DISCONTINUED | OUTPATIENT
Start: 2017-04-09 | End: 2017-04-09

## 2017-04-09 RX ORDER — QUETIAPINE FUMARATE 200 MG/1
25 TABLET, FILM COATED ORAL DAILY
Qty: 0 | Refills: 0 | Status: DISCONTINUED | OUTPATIENT
Start: 2017-04-09 | End: 2017-04-10

## 2017-04-09 RX ORDER — ASPIRIN/CALCIUM CARB/MAGNESIUM 324 MG
81 TABLET ORAL DAILY
Qty: 0 | Refills: 0 | Status: DISCONTINUED | OUTPATIENT
Start: 2017-04-09 | End: 2017-04-13

## 2017-04-09 RX ORDER — FOLIC ACID 0.8 MG
1 TABLET ORAL DAILY
Qty: 0 | Refills: 0 | Status: DISCONTINUED | OUTPATIENT
Start: 2017-04-09 | End: 2017-04-13

## 2017-04-09 RX ORDER — HALOPERIDOL DECANOATE 100 MG/ML
1 INJECTION INTRAMUSCULAR DAILY
Qty: 0 | Refills: 0 | Status: DISCONTINUED | OUTPATIENT
Start: 2017-04-09 | End: 2017-04-09

## 2017-04-09 RX ORDER — QUETIAPINE FUMARATE 200 MG/1
25 TABLET, FILM COATED ORAL EVERY 6 HOURS
Qty: 0 | Refills: 0 | Status: DISCONTINUED | OUTPATIENT
Start: 2017-04-09 | End: 2017-04-10

## 2017-04-09 RX ADMIN — Medication 2 MILLIGRAM(S): at 02:30

## 2017-04-09 RX ADMIN — Medication 2 MILLIGRAM(S): at 13:48

## 2017-04-09 RX ADMIN — Medication 105 MILLIGRAM(S): at 21:24

## 2017-04-09 RX ADMIN — Medication 2 MILLIGRAM(S): at 01:41

## 2017-04-09 RX ADMIN — Medication 2 MILLIGRAM(S): at 07:52

## 2017-04-09 RX ADMIN — HEPARIN SODIUM 5000 UNIT(S): 5000 INJECTION INTRAVENOUS; SUBCUTANEOUS at 21:24

## 2017-04-09 RX ADMIN — Medication 105 MILLIGRAM(S): at 14:53

## 2017-04-09 RX ADMIN — QUETIAPINE FUMARATE 25 MILLIGRAM(S): 200 TABLET, FILM COATED ORAL at 18:41

## 2017-04-09 RX ADMIN — Medication 105 MILLIGRAM(S): at 06:12

## 2017-04-09 RX ADMIN — HALOPERIDOL DECANOATE 1 MILLIGRAM(S): 100 INJECTION INTRAMUSCULAR at 05:57

## 2017-04-09 RX ADMIN — ATORVASTATIN CALCIUM 80 MILLIGRAM(S): 80 TABLET, FILM COATED ORAL at 21:24

## 2017-04-09 RX ADMIN — Medication 81 MILLIGRAM(S): at 18:41

## 2017-04-09 RX ADMIN — Medication 2 MILLIGRAM(S): at 08:40

## 2017-04-09 RX ADMIN — Medication 2 MILLIGRAM(S): at 21:24

## 2017-04-09 RX ADMIN — QUETIAPINE FUMARATE 25 MILLIGRAM(S): 200 TABLET, FILM COATED ORAL at 13:48

## 2017-04-09 NOTE — DIETITIAN INITIAL EVALUATION ADULT. - OTHER INFO
Admitted with CVA & ETOH abuse, s/p TPA. Noticed pt did not eat lunch tray at time of visit due to lethargy. Unable to obtain height this visit.

## 2017-04-09 NOTE — PROGRESS NOTE ADULT - SUBJECTIVE AND OBJECTIVE BOX
INTERVAL HPI/OVERNIGHT EVENTS: Pt was agitated with visual and auditory hallucinations, possibly 2/2 alcohol withdrawal, received Ativan 2mg IV x3, and Haldol 1mg x 1. Pt became delirious again in the AM, got additional Ativan 2mg IV. CT head shows no interval change      SUBJECTIVE: Patient agitated, was not answering questions      OBJECTIVE:    VITAL SIGNS:  ICU Vital Signs Last 24 Hrs  T(C): 36.6, Max: 36.8 (04-08 @ 18:42)  T(F): 97.8, Max: 98.3 (04-09 @ 06:00)  HR: 76 (68 - 100)  BP: 138/80 (104/64 - 158/95)  BP(mean): 94 (77 - 140)  ABP: --  ABP(mean): --  RR: 14 (8 - 34)  SpO2: 97% (89% - 97%)        I & Os for current day (as of 04-09 @ 17:20)  =============================================  IN: 130 ml / OUT: 925 ml / NET: -795 ml    CAPILLARY BLOOD GLUCOSE      PHYSICAL EXAM:    General: agitated, not answering questions  HEENT: PERRL, clear conjunctiva, MMM  Respiratory: CTA B/L, no W/R/R, no retractions  Cardiac: normal +S1/S2, RRR, no M/R/G  GI: soft, NTND, bowel sounds normoactive  Ext: No peripheral edema, no cyanosis, WWP, DP/radial 2+ b/l  Neurologic: pt agitated, not answering questions, not following commands       MEDICATIONS:  MEDICATIONS  (STANDING):  atorvastatin 80milliGRAM(s) Oral at bedtime  pantoprazole    Tablet 40milliGRAM(s) Oral before breakfast  colchicine 0.6milliGRAM(s) Oral daily  allopurinol 300milliGRAM(s) Oral daily  thiamine IVPB 500milliGRAM(s) IV Intermittent every 8 hours  folic acid 1milliGRAM(s) Oral daily  multivitamin 1Tablet(s) Oral daily  QUEtiapine 25milliGRAM(s) Oral daily    MEDICATIONS  (PRN):  QUEtiapine 25milliGRAM(s) Oral every 6 hours PRN agitation  LORazepam   Injectable 2milliGRAM(s) IV Push every 6 hours PRN Agitation or withdrawal      ALLERGIES:  Allergies    No Known Allergies    Intolerances        LABS:                        15.5   7.0   )-----------( 190      ( 08 Apr 2017 17:25 )             43.5     04-09    141  |  107  |  18  ----------------------------<  128<H>  4.4   |  26  |  0.82    Ca    8.8      09 Apr 2017 05:37    TPro  6.9  /  Alb  3.6  /  TBili  1.5<H>  /  DBili  0.36<H>  /  AST  19  /  ALT  44<H>  /  AlkPhos  117  04-09    PT/INR - ( 07 Apr 2017 18:00 )   PT: 10.4 sec;   INR: 0.94          PTT - ( 07 Apr 2017 18:00 )  PTT:36.7 sec      RADIOLOGY & ADDITIONAL TESTS: Reviewed.    ASSESSMENT/PLAN: Pt is a 60yo M with PMH of HTN, glaucoma, gout, alcohol abuse and RA, presenting with slurred speech and confusion, found to have R facial droop and word-finding difficulty, now s/p tPA at 6:41pm on 4/7; admitted for post-tpa monitoring in MICU.    NEURO  #CVA: Pt presenting with slurred speech and confusion, found to have R facial droop and word-finding difficulty on exam. STAT CT head done, found to have no intracranial hemorrorhage; tPA given at 18:41PM on 4/7; admitted for post-tpa monitoring in MICU. CT head shows no interval change, no hydrocephalus, midline shift, acute intracranial hemorrhage or demarcated territorial infarct.  - Neuro checks q1hr.  - Passed dysphagia screen - diet advanced, home po meds restarted.  - PT/OT/Speech consults, f/u recs.    #EtOH abuse: Concern given hx of alcohol abuse (provided by wife), pt was delirious, with visual and auditory hallucinations overnight s/p Ativan 2mg IV x 4. MRI with sagittal cross-sections to fully evaluate mammary bodies.  - Start Ativan     CV  #HTN: goal SBP <180mmHg. Patient at goal. Holding home Nosvasc 5mg po qd.     Pulm: no acute issues  Renal: no acute issues  GI: no acute issues  Endo: Hgb A1c 6.9%, TSH wnl  ID: no acute issues  Heme: no acute issues    Rheum  #Gout: C/w home colchicine and allopurinol    FEN: DASH/TLC diet (passed dysphagia screening), replete lytes prn for K<4, Mg<2.  PPx: Holding HSQ and ASA pending repeat CT scan results. SCDs. Protonix.    FULL CODE  Dispo: MICU INTERVAL HPI/OVERNIGHT EVENTS: Pt was agitated with visual and auditory hallucinations, possibly 2/2 alcohol withdrawal, received Ativan 2mg IV x3, and Haldol 1mg x 1. Pt became delirious again in the AM, got additional Ativan 2mg IV. CT head shows no interval change      SUBJECTIVE: Patient agitated, was not answering questions      OBJECTIVE:    VITAL SIGNS:  ICU Vital Signs Last 24 Hrs  T(C): 36.6, Max: 36.8 (04-08 @ 18:42)  T(F): 97.8, Max: 98.3 (04-09 @ 06:00)  HR: 76 (68 - 100)  BP: 138/80 (104/64 - 158/95)  BP(mean): 94 (77 - 140)  ABP: --  ABP(mean): --  RR: 14 (8 - 34)  SpO2: 97% (89% - 97%)        I & Os for current day (as of 04-09 @ 17:20)  =============================================  IN: 130 ml / OUT: 925 ml / NET: -795 ml    CAPILLARY BLOOD GLUCOSE      PHYSICAL EXAM:    General: agitated, not answering questions  HEENT: PERRL, clear conjunctiva, MMM  Respiratory: CTA B/L, no W/R/R, no retractions  Cardiac: normal +S1/S2, RRR, no M/R/G  GI: soft, NTND, bowel sounds normoactive  Ext: No peripheral edema, no cyanosis, WWP, DP/radial 2+ b/l  Neurologic: pt agitated, not answering questions, not following commands       MEDICATIONS:  MEDICATIONS  (STANDING):  atorvastatin 80milliGRAM(s) Oral at bedtime  pantoprazole    Tablet 40milliGRAM(s) Oral before breakfast  colchicine 0.6milliGRAM(s) Oral daily  allopurinol 300milliGRAM(s) Oral daily  thiamine IVPB 500milliGRAM(s) IV Intermittent every 8 hours  folic acid 1milliGRAM(s) Oral daily  multivitamin 1Tablet(s) Oral daily  QUEtiapine 25milliGRAM(s) Oral daily    MEDICATIONS  (PRN):  QUEtiapine 25milliGRAM(s) Oral every 6 hours PRN agitation  LORazepam   Injectable 2milliGRAM(s) IV Push every 6 hours PRN Agitation or withdrawal      ALLERGIES:  Allergies    No Known Allergies    Intolerances        LABS:                        15.5   7.0   )-----------( 190      ( 08 Apr 2017 17:25 )             43.5     04-09    141  |  107  |  18  ----------------------------<  128<H>  4.4   |  26  |  0.82    Ca    8.8      09 Apr 2017 05:37    TPro  6.9  /  Alb  3.6  /  TBili  1.5<H>  /  DBili  0.36<H>  /  AST  19  /  ALT  44<H>  /  AlkPhos  117  04-09    PT/INR - ( 07 Apr 2017 18:00 )   PT: 10.4 sec;   INR: 0.94          PTT - ( 07 Apr 2017 18:00 )  PTT:36.7 sec      RADIOLOGY & ADDITIONAL TESTS: Reviewed.    ASSESSMENT/PLAN: Pt is a 60yo M with PMH of HTN, glaucoma, gout, alcohol abuse and RA, presenting with slurred speech and confusion, found to have R facial droop and word-finding difficulty, now s/p tPA at 6:41pm on 4/7; admitted for post-tpa monitoring in MICU.    NEURO  #CVA: Pt presenting with slurred speech and confusion, found to have R facial droop and word-finding difficulty on exam. STAT CT head done, found to have no intracranial hemorrorhage; tPA given at 18:41PM on 4/7; admitted for post-tpa monitoring in MICU. CT head shows no interval change, no hydrocephalus, midline shift, acute intracranial hemorrhage or demarcated territorial infarct.   - PT/OT/Speech consults, f/u recs  - start aspirin 81mg daily  - c/w lipitor 80mg daily    #Delirium: concern given hx of alcohol abuse (provided by wife), pt was delirious, with visual and auditory hallucinations overnight s/p Ativan 2mg IV x 4. MRI with sagittal cross-sections to fully evaluate mammary bodies.  - Start Ativan 2mg IV q6 PRN for agitation/withdrawal symptoms  - Seroquel 25mg daily at 6pm, Seroquel 25mg q6hrs PRN  - c/w folic acid, multivitamin, and thiamine  - Monitor CIWA      CV  #HTN: goal SBP <180mmHg. Patient at goal. Holding home Norvasc 5mg po qd.     RHEUM  #Gout: C/w home colchicine and allopurinol    Endo: Hgb A1c 6.9%, TSH wnl  - iSS    Pulm: no acute issues  Renal: no acute issues  GI: no acute issues  ID: no acute issues  Heme: no acute issues    FEN: DASH/TLC diet (passed dysphagia screening), replete lytes prn for K<4, Mg<2.  PPx: HSQ and ASAm SCDs. Protonix.    FULL CODE  Dispo: MICU INTERVAL HPI/OVERNIGHT EVENTS: Pt was agitated with visual and auditory hallucinations, possibly 2/2 alcohol withdrawal, received Ativan 2mg IV x3, and Haldol 1mg x 1. Pt became delirious again in the AM, got additional Ativan 2mg IV. CT head shows no interval change      SUBJECTIVE: Patient agitated, was not answering questions      OBJECTIVE:    VITAL SIGNS:  ICU Vital Signs Last 24 Hrs  T(C): 36.6, Max: 36.8 (04-08 @ 18:42)  T(F): 97.8, Max: 98.3 (04-09 @ 06:00)  HR: 76 (68 - 100)  BP: 138/80 (104/64 - 158/95)  BP(mean): 94 (77 - 140)  ABP: --  ABP(mean): --  RR: 14 (8 - 34)  SpO2: 97% (89% - 97%)        I & Os for current day (as of 04-09 @ 17:20)  =============================================  IN: 130 ml / OUT: 925 ml / NET: -795 ml    CAPILLARY BLOOD GLUCOSE      PHYSICAL EXAM:    General: agitated, not answering questions  HEENT: PERRL, clear conjunctiva, MMM  Respiratory: CTA B/L, no W/R/R, no retractions  Cardiac: normal +S1/S2, RRR, no M/R/G  GI: soft, NTND, bowel sounds normoactive  Ext: No peripheral edema, no cyanosis, WWP,   Vasc: DP/radial 2+ b/l  MSK: no joint swelling  skin: no rash  Neurologic/psych: pt agitated, not answering questions, not following commands but moves all extremities       MEDICATIONS:  MEDICATIONS  (STANDING):  atorvastatin 80milliGRAM(s) Oral at bedtime  pantoprazole    Tablet 40milliGRAM(s) Oral before breakfast  colchicine 0.6milliGRAM(s) Oral daily  allopurinol 300milliGRAM(s) Oral daily  thiamine IVPB 500milliGRAM(s) IV Intermittent every 8 hours  folic acid 1milliGRAM(s) Oral daily  multivitamin 1Tablet(s) Oral daily  QUEtiapine 25milliGRAM(s) Oral daily    MEDICATIONS  (PRN):  QUEtiapine 25milliGRAM(s) Oral every 6 hours PRN agitation  LORazepam   Injectable 2milliGRAM(s) IV Push every 6 hours PRN Agitation or withdrawal      ALLERGIES:  Allergies    No Known Allergies    Intolerances        LABS:                        15.5   7.0   )-----------( 190      ( 08 Apr 2017 17:25 )             43.5     04-09    141  |  107  |  18  ----------------------------<  128<H>  4.4   |  26  |  0.82    Ca    8.8      09 Apr 2017 05:37    TPro  6.9  /  Alb  3.6  /  TBili  1.5<H>  /  DBili  0.36<H>  /  AST  19  /  ALT  44<H>  /  AlkPhos  117  04-09    PT/INR - ( 07 Apr 2017 18:00 )   PT: 10.4 sec;   INR: 0.94          PTT - ( 07 Apr 2017 18:00 )  PTT:36.7 sec      RADIOLOGY & ADDITIONAL TESTS: Reviewed.    ASSESSMENT/PLAN: Pt is a 62yo M with PMH of HTN, glaucoma, gout, alcohol abuse and RA, presenting with slurred speech and confusion, found to have R facial droop and word-finding difficulty, now s/p tPA at 6:41pm on 4/7; admitted for post-tpa monitoring in MICU now with delirium possibly related to alcohol withdrawal.    NEURO  #CVA: Pt presenting with slurred speech and confusion, found to have R facial droop and word-finding difficulty on exam. STAT CT head done, found to have no intracranial hemorrorhage; tPA given at 18:41PM on 4/7; admitted for post-tpa monitoring in MICU. CT head shows no interval change, no hydrocephalus, midline shift, acute intracranial hemorrhage or demarcated territorial infarct.   - PT/OT/Speech consults, f/u recs  - start aspirin 81mg daily  - c/w lipitor 80mg daily    #Delirium: concern given hx of alcohol abuse (provided by wife), pt was delirious, with visual and auditory hallucinations overnight s/p Ativan 2mg IV x 4. MRI with sagittal cross-sections to fully evaluate mammary bodies.  - Start Ativan 2mg IV q6 PRN for agitation/withdrawal symptoms  - Seroquel 25mg daily at 6pm, Seroquel 25mg q6hrs PRN  - c/w folic acid, multivitamin, and thiamine  - Monitor CIWA      CV  #HTN: goal SBP <180mmHg. Patient at goal. Holding home Norvasc 5mg po qd.     RHEUM  #Gout: C/w home colchicine and allopurinol    Endo: Hgb A1c 6.9%, TSH wnl  - iSS    Pulm: no acute issues  Renal: no acute issues  GI: no acute issues  ID: no acute issues  Heme: no acute issues    FEN: DASH/TLC diet (passed dysphagia screening), replete lytes prn for K<4, Mg<2.  PPx: HSQ and ASAm SCDs. Protonix.    FULL CODE  Dispo: [facility] INTERVAL HPI/OVERNIGHT EVENTS: Pt was agitated with visual and auditory hallucinations, possibly 2/2 alcohol withdrawal, received Ativan 2mg IV x3, and Haldol 1mg x 1. Pt became delirious again in the AM, got additional Ativan 2mg IV. CT head shows no interval change      SUBJECTIVE: Patient agitated, was not answering questions      OBJECTIVE:    VITAL SIGNS:  ICU Vital Signs Last 24 Hrs  T(C): 36.6, Max: 36.8 (04-08 @ 18:42)  T(F): 97.8, Max: 98.3 (04-09 @ 06:00)  HR: 76 (68 - 100)  BP: 138/80 (104/64 - 158/95)  BP(mean): 94 (77 - 140)  ABP: --  ABP(mean): --  RR: 14 (8 - 34)  SpO2: 97% (89% - 97%)        I & Os for current day (as of 04-09 @ 17:20)  =============================================  IN: 130 ml / OUT: 925 ml / NET: -795 ml    CAPILLARY BLOOD GLUCOSE      PHYSICAL EXAM:    General: agitated, not answering questions  HEENT: PERRL, clear conjunctiva, MMM  Respiratory: CTA B/L, no W/R/R, no retractions  Cardiac: normal +S1/S2, RRR, no M/R/G  GI: soft, NTND, bowel sounds normoactive  Ext: No peripheral edema, no cyanosis, WWP,   Vasc: DP/radial 2+ b/l  MSK: no joint swelling  skin: no rash  Neurologic/psych: pt agitated, not answering questions, not following commands but moves all extremities       MEDICATIONS:  MEDICATIONS  (STANDING):  atorvastatin 80milliGRAM(s) Oral at bedtime  pantoprazole    Tablet 40milliGRAM(s) Oral before breakfast  colchicine 0.6milliGRAM(s) Oral daily  allopurinol 300milliGRAM(s) Oral daily  thiamine IVPB 500milliGRAM(s) IV Intermittent every 8 hours  folic acid 1milliGRAM(s) Oral daily  multivitamin 1Tablet(s) Oral daily  QUEtiapine 25milliGRAM(s) Oral daily    MEDICATIONS  (PRN):  QUEtiapine 25milliGRAM(s) Oral every 6 hours PRN agitation  LORazepam   Injectable 2milliGRAM(s) IV Push every 6 hours PRN Agitation or withdrawal      ALLERGIES:  Allergies    No Known Allergies    Intolerances        LABS:                        15.5   7.0   )-----------( 190      ( 08 Apr 2017 17:25 )             43.5     04-09    141  |  107  |  18  ----------------------------<  128<H>  4.4   |  26  |  0.82    Ca    8.8      09 Apr 2017 05:37    TPro  6.9  /  Alb  3.6  /  TBili  1.5<H>  /  DBili  0.36<H>  /  AST  19  /  ALT  44<H>  /  AlkPhos  117  04-09    PT/INR - ( 07 Apr 2017 18:00 )   PT: 10.4 sec;   INR: 0.94          PTT - ( 07 Apr 2017 18:00 )  PTT:36.7 sec      RADIOLOGY & ADDITIONAL TESTS: Reviewed.    ASSESSMENT/PLAN: Pt is a 62yo M with PMH of HTN, glaucoma, gout, alcohol abuse and RA, presenting with slurred speech and confusion, found to have R facial droop and word-finding difficulty, now s/p tPA at 6:41pm on 4/7; admitted for post-tpa monitoring in MICU now with delirium possibly related to alcohol withdrawal/ toxic metabolic encephalopathy.    NEURO  #CVA: Pt presenting with slurred speech and confusion, found to have R facial droop and word-finding difficulty on exam. STAT CT head done, found to have no intracranial hemorrorhage; tPA given at 18:41PM on 4/7; admitted for post-tpa monitoring in MICU. CT head shows no interval change, no hydrocephalus, midline shift, acute intracranial hemorrhage or demarcated territorial infarct.   - PT/OT/Speech consults, f/u recs  - start aspirin 81mg daily  - c/w lipitor 80mg daily    #Delirium: concern given hx of alcohol abuse (provided by wife), pt was delirious, with visual and auditory hallucinations overnight s/p Ativan 2mg IV x 4. MRI with sagittal cross-sections to fully evaluate mammary bodies.  - Start Ativan 2mg IV q6 PRN for agitation/withdrawal symptoms  - Seroquel 25mg daily at 6pm, Seroquel 25mg q6hrs PRN  - c/w folic acid, multivitamin, and thiamine  - Monitor CIWA      CV  #HTN: goal SBP <180mmHg. Patient at goal. Holding home Norvasc 5mg po qd.     RHEUM  #Gout: C/w home colchicine and allopurinol    Endo: Hgb A1c 6.9%, TSH wnl  - iSS    Pulm: no acute issues  Renal: no acute issues  GI: no acute issues  ID: no acute issues  Heme: no acute issues    FEN: DASH/TLC diet (passed dysphagia screening), replete lytes prn for K<4, Mg<2.  PPx: HSQ and ASAm SCDs. Protonix.    FULL CODE  Dispo: [facility]

## 2017-04-10 LAB
ANION GAP SERPL CALC-SCNC: 9 MMOL/L — SIGNIFICANT CHANGE UP (ref 9–16)
BUN SERPL-MCNC: 18 MG/DL — SIGNIFICANT CHANGE UP (ref 7–23)
CALCIUM SERPL-MCNC: 8.7 MG/DL — SIGNIFICANT CHANGE UP (ref 8.5–10.5)
CHLORIDE SERPL-SCNC: 106 MMOL/L — SIGNIFICANT CHANGE UP (ref 96–108)
CO2 SERPL-SCNC: 22 MMOL/L — SIGNIFICANT CHANGE UP (ref 22–31)
CREAT SERPL-MCNC: 0.88 MG/DL — SIGNIFICANT CHANGE UP (ref 0.5–1.3)
FOLATE SERPL-MCNC: 19.6 NG/ML — SIGNIFICANT CHANGE UP (ref 4.8–24.2)
GLUCOSE SERPL-MCNC: 111 MG/DL — HIGH (ref 70–99)
MAGNESIUM SERPL-MCNC: 2.1 MG/DL — SIGNIFICANT CHANGE UP (ref 1.6–2.4)
PHOSPHATE SERPL-MCNC: 3.6 MG/DL — SIGNIFICANT CHANGE UP (ref 2.5–4.5)
POTASSIUM SERPL-MCNC: 4.6 MMOL/L — SIGNIFICANT CHANGE UP (ref 3.5–5.3)
POTASSIUM SERPL-SCNC: 4.6 MMOL/L — SIGNIFICANT CHANGE UP (ref 3.5–5.3)
SODIUM SERPL-SCNC: 137 MMOL/L — SIGNIFICANT CHANGE UP (ref 135–145)
T PALLIDUM AB TITR SER: NEGATIVE — SIGNIFICANT CHANGE UP
VIT B12 SERPL-MCNC: 306 PG/ML — SIGNIFICANT CHANGE UP (ref 243–894)

## 2017-04-10 PROCEDURE — 72125 CT NECK SPINE W/O DYE: CPT | Mod: 26

## 2017-04-10 PROCEDURE — 99232 SBSQ HOSP IP/OBS MODERATE 35: CPT

## 2017-04-10 PROCEDURE — 70450 CT HEAD/BRAIN W/O DYE: CPT | Mod: 26

## 2017-04-10 PROCEDURE — 99233 SBSQ HOSP IP/OBS HIGH 50: CPT | Mod: GC

## 2017-04-10 RX ORDER — TIMOLOL 0.5 %
1 DROPS OPHTHALMIC (EYE) DAILY
Qty: 0 | Refills: 0 | Status: DISCONTINUED | OUTPATIENT
Start: 2017-04-10 | End: 2017-04-10

## 2017-04-10 RX ORDER — TIMOLOL 0.5 %
1 DROPS OPHTHALMIC (EYE) DAILY
Qty: 0 | Refills: 0 | Status: DISCONTINUED | OUTPATIENT
Start: 2017-04-10 | End: 2017-04-13

## 2017-04-10 RX ORDER — MUPIROCIN 20 MG/G
1 OINTMENT TOPICAL THREE TIMES A DAY
Qty: 0 | Refills: 0 | Status: DISCONTINUED | OUTPATIENT
Start: 2017-04-10 | End: 2017-04-12

## 2017-04-10 RX ORDER — QUETIAPINE FUMARATE 200 MG/1
12.5 TABLET, FILM COATED ORAL DAILY
Qty: 0 | Refills: 0 | Status: DISCONTINUED | OUTPATIENT
Start: 2017-04-10 | End: 2017-04-11

## 2017-04-10 RX ORDER — LATANOPROST 0.05 MG/ML
1 SOLUTION/ DROPS OPHTHALMIC; TOPICAL AT BEDTIME
Qty: 0 | Refills: 0 | Status: DISCONTINUED | OUTPATIENT
Start: 2017-04-10 | End: 2017-04-13

## 2017-04-10 RX ADMIN — Medication 0.6 MILLIGRAM(S): at 11:14

## 2017-04-10 RX ADMIN — LATANOPROST 1 DROP(S): 0.05 SOLUTION/ DROPS OPHTHALMIC; TOPICAL at 22:18

## 2017-04-10 RX ADMIN — Medication 1 DROP(S): at 14:16

## 2017-04-10 RX ADMIN — Medication 1 TABLET(S): at 11:14

## 2017-04-10 RX ADMIN — Medication 105 MILLIGRAM(S): at 14:18

## 2017-04-10 RX ADMIN — Medication 81 MILLIGRAM(S): at 11:14

## 2017-04-10 RX ADMIN — HEPARIN SODIUM 5000 UNIT(S): 5000 INJECTION INTRAVENOUS; SUBCUTANEOUS at 13:25

## 2017-04-10 RX ADMIN — Medication 2 MILLIGRAM(S): at 03:27

## 2017-04-10 RX ADMIN — QUETIAPINE FUMARATE 25 MILLIGRAM(S): 200 TABLET, FILM COATED ORAL at 06:38

## 2017-04-10 RX ADMIN — Medication 1 MILLIGRAM(S): at 11:14

## 2017-04-10 RX ADMIN — HEPARIN SODIUM 5000 UNIT(S): 5000 INJECTION INTRAVENOUS; SUBCUTANEOUS at 06:38

## 2017-04-10 RX ADMIN — Medication 300 MILLIGRAM(S): at 11:14

## 2017-04-10 RX ADMIN — Medication 105 MILLIGRAM(S): at 06:38

## 2017-04-10 RX ADMIN — QUETIAPINE FUMARATE 12.5 MILLIGRAM(S): 200 TABLET, FILM COATED ORAL at 18:41

## 2017-04-10 RX ADMIN — Medication 105 MILLIGRAM(S): at 22:17

## 2017-04-10 RX ADMIN — MUPIROCIN 1 APPLICATION(S): 20 OINTMENT TOPICAL at 22:17

## 2017-04-10 RX ADMIN — PANTOPRAZOLE SODIUM 40 MILLIGRAM(S): 20 TABLET, DELAYED RELEASE ORAL at 06:38

## 2017-04-10 RX ADMIN — Medication 2 MILLIGRAM(S): at 19:57

## 2017-04-10 RX ADMIN — HEPARIN SODIUM 5000 UNIT(S): 5000 INJECTION INTRAVENOUS; SUBCUTANEOUS at 22:17

## 2017-04-10 NOTE — CONSULT NOTE ADULT - SUBJECTIVE AND OBJECTIVE BOX
MARIO MAY    [id number]    Patient is a 61y old  Male who presents with a chief complaint of Slurred speech (07 Apr 2017 22:57)      HPI:  62yo M with PMHx of HTN, glaucoma, gout, and RA, presents to the ED BIBEMS after co-workers noticed that patient was having slurred speech, and looked confused today at 5pm. As per patient, he did not notice any change in him, and was comfortable at work when he suddenly felt something was "off", although he can not specify exactly what, so he took a break from his duties, sat down, and that's when his coworkers noticed the changes in him, and called EMS. Patient states he did not feel any weakness, lightheadedness, numbness, trouble with his vision, or noticed any slurred speech.     Before arriving to the ED, stroke code called. In the ED patient's exam was significant for difficulty with word finding, and mild right facial droop. CT was done stat and showed no intracranial bleeding, and at 18:41PM tPA bolus was given and drip started at 18:43 PM.     In the ED, vital signs were:  - BP: 195/94mmHg  - HR: 80  - RR: 18  - O2Sat: 99% on RA  - Temp: 99 (07 Apr 2017 22:57)      PAST MEDICAL & SURGICAL HISTORY:  Arthritis  Chronic gout without tophus, unspecified cause, unspecified site  Glaucoma, unspecified glaucoma, unspecified laterality  Hypertension  H/O eye surgery  No significant past surgical history        Social History:    FAMILY HISTORY:  Family history of asthma (Mother, Sibling)  Family history of diabetes mellitus in mother (Mother)      Functional Level Prior to Admission:                          15.5   7.0   )-----------( 190      ( 08 Apr 2017 17:25 )             43.5       04-10    137  |  106  |  18  ----------------------------<  111<H>  4.6   |  22  |  0.88    Ca    8.7      10 Apr 2017 06:23  Phos  3.6     04-10  Mg     2.1     04-10    TPro  6.9  /  Alb  3.6  /  TBili  1.5<H>  /  DBili  0.36<H>  /  AST  19  /  ALT  44<H>  /  AlkPhos  117  04-09      Vital Signs Last 24 Hrs  T(C): 37.1, Max: 37.2 (04-09 @ 18:22)  T(F): 98.8, Max: 99 (04-09 @ 18:22)  HR: 82 (70 - 102)  BP: 138/77 (116/74 - 157/76)  BP(mean): 95 (84 - 108)  RR: 18 (14 - 26)  SpO2: 92% (92% - 97%)      PHYSICAL EXAM: This 61 y-o  male was admitted on 4/7/17 with sluured speech, confusion and Rt. facial droop. CT head 4/7 and 4/8/17; no acute pathology. s/p tPA. h/o hypertension, gout and RA. Lives with wife. Was independent. Long h/o ETOH.      Constitutional: Sedated due to agitation    Eyes:    ENMT:    Neck:    Breasts:    Back:    Respiratory:    Cardiovascular:    Gastrointestinal:    Genitourinary:    Rectal:    Extremities:    Vascular:    Neurological:    Skin:    Lymph Nodes:    Musculoskeletal:    Psychiatric:            Impression: 1. CVA. 2 Agitation secondary to ETOH withdrawal.    Recommendations: Will follow (re-evaluate when appropriate.

## 2017-04-10 NOTE — PROVIDER CONTACT NOTE (FALL NOTIFICATION) - RECOMMENDATIONS
patient assisted back into bed, dressing applied to left eyebrow after pressure held to control bleeding, and poesy vest reapplied for patient safety.

## 2017-04-10 NOTE — PROVIDER CONTACT NOTE (FALL NOTIFICATION) - BACKGROUND
patient here for CVA, s/p tPA.  and ETOH withdrawal.  patient had prior agitation and confusion, patient was confused at this time, but cooperative.

## 2017-04-10 NOTE — PROGRESS NOTE ADULT - SUBJECTIVE AND OBJECTIVE BOX
Stroke Neurology Follow-Up Visit    Interval History:  Pt with fall this morning from chair.  Left forehead laceration sutured.  CT head/cspine ordered.     Per wife, pt still confused but not far from baseline.        Exam:  T(F): 97.9, Max: 98.8 (04-10 @ 01:00)  HR: 97 (80 - 102)  BP: 110/72 (103/71 - 149/84)  RR: 22 (15 - 26)  SpO2: 95% (92% - 97%)  Wt(kg): --    Gen: No acute distress, left forehead (at eyebrow) swelling and sutured laceration.  Neuro:  Mental status: awake, alert, occasional nonsensical speech, follows commands, oriented x 2.  Not cooperative with portions of exam.  Tangential.  Dysarthric.    Cranial nerves: Pupils equally round and reactive to light, visual fields full, no nystagmus, extraocular muscles intact, V1 through V3 intact bilaterally and symmetric, face symmetric, hearing intact to finger rub, palate elevation symmetric, tongue was midline, sternocleidomastoid/shoulder shrug strength bilaterally 5/5.    Motor:  Normal bulk and tone, strength 5/5 in bilateral upper and lower extremities.   strength 5/5.  SANTO- did not cooperate.   Sensation: Intact to light touch.  No neglect.   Coordination: Did not cooperate.  Reflexes: 2+ in upper and lower extremities, absent Babinski bilaterally    MEDICATIONS  (STANDING):  atorvastatin 80milliGRAM(s) Oral at bedtime  pantoprazole    Tablet 40milliGRAM(s) Oral before breakfast  colchicine 0.6milliGRAM(s) Oral daily  allopurinol 300milliGRAM(s) Oral daily  thiamine IVPB 500milliGRAM(s) IV Intermittent every 8 hours  folic acid 1milliGRAM(s) Oral daily  multivitamin 1Tablet(s) Oral daily  aspirin enteric coated 81milliGRAM(s) Oral daily  heparin  Injectable 5000Unit(s) SubCutaneous every 8 hours  QUEtiapine 12.5milliGRAM(s) Oral daily  latanoprost 0.005% Ophthalmic Solution 1Drop(s) Left EYE at bedtime  timolol 0.5% Solution 1Drop(s) Right EYE daily  mupirocin 2% Ointment 1Application(s) Topical three times a day    MEDICATIONS  (PRN):  LORazepam   Injectable 2milliGRAM(s) IV Push every 6 hours PRN Agitation or withdrawal      Labs:    04-10    137  |  106  |  18  ----------------------------<  111<H>  4.6   |  22  |  0.88    Ca    8.7      10 Apr 2017 06:23  Phos  3.6     04-10  Mg     2.1     04-10    TPro  6.9  /  Alb  3.6  /  TBili  1.5<H>  /  DBili  0.36<H>  /  AST  19  /  ALT  44<H>  /  AlkPhos  117  04-09    Hemoglobin A1C, Whole Blood: 6.9 % (04-07 @ 18:00)  Thyroid Stimulating Hormone, Serum: 2.515 uIU/mL (04-07 @ 18:00)  Cholesterol, Serum: 177 mg/dL (04-07 @ 18:00)  Triglycerides, Serum: 355 mg/dL (04-07 @ 18:00)  HDL Cholesterol, Serum: 38 mg/dL (04-07 @ 18:00)    LIVER FUNCTIONS - ( 09 Apr 2017 05:37 )  Alb: 3.6 g/dL / Pro: 6.9 g/dL / ALK PHOS: 117 U/L / ALT: 44 U/L / AST: 19 U/L / GGT: x           Radiology:  CT head   IMPRESSION:   1. No evidence of acute intracranial hemorrhage and no apparent acute   abnormality.  2. Parenchymal volume loss and chronic microangiopathic disease, stable.    CTA head/neck     IMPRESSION:   No hemodynamically significant carotid or vertebral artery stenosis. Left   vertebral artery is dominant.  IMPRESSION: Intracranial CTA demonstrates no proximal vessel occlusion or   high-grade stenosis about the [place].    Assessment & Plan:  61 year-old man with etoh abuse, hypertension, RA presented with slurred speech, confusion, right facial droop, word-finding difficulty s/p tPA on 4/7 for possible stroke.  Course complicated by delirium/agitation possibly related to alcohol withdrawal/ toxic metabolic encephalopathy.  Per wife, he has become progressively more confused in the last year.  Has developed cognitive issues as well.  Repeat CT head today with no evidence of stroke.  No residual focal deficits.  Recommend MRI Brain w/o contrast to definitively rule out stroke and with sagittal cross-sections to fully evaluate mammary bodies..  Continue aspirin 81 mg daily.  Continue Lipitor 80mg po qd.  Continue thiamine.  Per wife, his mental status is not far from baseline.  If MRI negative for stroke, needs work-up for cognitive decline.

## 2017-04-10 NOTE — PROGRESS NOTE ADULT - SUBJECTIVE AND OBJECTIVE BOX
PGY-1 PROGRESS NOTE    O/N EVENTS:   S:   O: See below for vitals, PE and labs.    ICU Vital Signs Last 24 Hrs  T(F): 98.8, Max: 99 (04-09 @ 18:22)  HR: 96 (68 - 102)  BP: 134/84 (104/64 - 157/76)  BP(mean): 99 (77 - 108)  RR: 26 (14 - 26)  SpO2: 97% (93% - 97%)    I&O's Summary  I & Os for 24h ending 09 Apr 2017 07:00  =============================================  IN: 130 ml / OUT: 925 ml / NET: -795 ml    I & Os for current day (as of 10 Apr 2017 06:23)  =============================================  IN: 200 ml / OUT: 0 ml / NET: 200 ml    PE: General: NAD, comfortable lying in bed, A&Ox3  HEENT: PERRL, EOMI, clear conjunctiva, MMM  Respiratory: CTA B/L, no W/R/R, no retractions  Cardiac: normal +S1/S2, RRR, no M/R/G  GI: soft, NTND, bowel sounds normoactive  Ext: No peripheral edema  MSK: NROM x4, no joint swelling, tenderness or erythema  Neurologic: AAOx3, no discernable R facial droop, CN intact; b/l UE and LE motor strength 5/5, sensory intact b/l, no dysdiadokinesis or dysmetria; reflexes intact, no babinski  vasc: 2+ pulses    Labs: Awaiting AM labs.    MEDICATIONS:  atorvastatin 80milliGRAM(s) Oral at bedtime  pantoprazole    Tablet 40milliGRAM(s) Oral before breakfast  colchicine 0.6milliGRAM(s) Oral daily  allopurinol 300milliGRAM(s) Oral daily  thiamine IVPB 500milliGRAM(s) IV Intermittent every 8 hours  folic acid 1milliGRAM(s) Oral daily  multivitamin 1Tablet(s) Oral daily  QUEtiapine 25milliGRAM(s) Oral daily  aspirin enteric coated 81milliGRAM(s) Oral daily  heparin  Injectable 5000Unit(s) SubCutaneous every 8 hours  QUEtiapine 25milliGRAM(s) Oral every 6 hours PRN agitation  LORazepam   Injectable 2milliGRAM(s) IV Push every 6 hours PRN Agitation or withdrawal PGY-1 PROGRESS NOTE    O/N EVENTS: KYMBERLY. Pt received Ativan and Seroquel as scheduled for agitation.  INTERVAL EVENTS:   S: Pt sedated this morning and responds slowly to questions. A&Ox2, unaware of location. Denies HA, N/V, anxiety. CIWA 0.   O: See below for vitals, PE and labs.    ICU Vital Signs Last 24 Hrs  T(F): 98.8, Max: 99 (04-09 @ 18:22)  HR: 96 (68 - 102)  BP: 134/84 (104/64 - 157/76)  BP(mean): 99 (77 - 108)  RR: 26 (14 - 26)  SpO2: 97% (93% - 97%)    I&O's Summary  I & Os for 24h ending 09 Apr 2017 07:00  =============================================  IN: 130 ml / OUT: 925 ml / NET: -795 ml    I & Os for current day (as of 10 Apr 2017 06:23)  =============================================  IN: 200 ml / OUT: 0 ml / NET: 200 ml    PE: General: NAD, comfortable lying in bed, A&Ox3  HEENT: PERRL, EOMI, clear conjunctiva, MMM  Respiratory: CTA B/L, no W/R/R, no retractions  Cardiac: normal +S1/S2, RRR, no M/R/G  GI: soft, NTND, bowel sounds normoactive  Ext: No peripheral edema  MSK: NROM x4, no joint swelling, tenderness or erythema  Neurologic: AAOx3, no discernable R facial droop, CN intact; b/l UE and LE motor strength 5/5, sensory intact b/l, no dysdiadokinesis or dysmetria; reflexes intact, no babinski  vasc: 2+ pulses    Labs:             15.5   7.0   )-----------( 190                 43.5     137  |  106  |  18  ----------------------------<  111  4.6   |  22  |  0.88    Ca     8.7       Phos  3.6      Mg     2.1       TPro  6.9  /  Alb  3.6  /  TBili  1.5  /  DBili  0.36  /  AST  19  /  ALT  44 /  AlkPhos  117     MEDICATIONS:  atorvastatin 80milliGRAM(s) Oral at bedtime  pantoprazole    Tablet 40milliGRAM(s) Oral before breakfast  colchicine 0.6milliGRAM(s) Oral daily  allopurinol 300milliGRAM(s) Oral daily  thiamine IVPB 500milliGRAM(s) IV Intermittent every 8 hours  folic acid 1milliGRAM(s) Oral daily  multivitamin 1Tablet(s) Oral daily  aspirin enteric coated 81milliGRAM(s) Oral daily  heparin  Injectable 5000Unit(s) SubCutaneous every 8 hours  QUEtiapine 12.5milliGRAM(s) Oral daily  LORazepam   Injectable 2milliGRAM(s) IV Push every 6 hours PRN Agitation or withdrawal PGY-1 PROGRESS NOTE    O/N EVENTS: KYMBERLY. Pt received Ativan and Seroquel as scheduled for agitation.  INTERVAL EVENTS: S/p fall related to agitation/sedation this AM. Pt was sitting in chair and then found lying on ground, likely leaned forward, falling and hitting his head. + L sided above eye 2cm in length and 0.5-1cm deep laceration. Awaiting CT head/neck, maxillofacial bones, and cervical spine. Plastic surgery consulted for suturing of forehead laceration.  S: Pt sedated this morning and responds slowly to questions. A&Ox2, unaware of location. Denies HA, N/V, anxiety. CIWA 0.   O: See below for vitals, PE and labs.    ICU Vital Signs Last 24 Hrs  T(F): 98.8, Max: 99 (04-09 @ 18:22)  HR: 96 (68 - 102)  BP: 134/84 (104/64 - 157/76)  BP(mean): 99 (77 - 108)  RR: 26 (14 - 26)  SpO2: 97% (93% - 97%)    I&O's Summary  I & Os for 24h ending 09 Apr 2017 07:00  =============================================  IN: 130 ml / OUT: 925 ml / NET: -795 ml    I & Os for current day (as of 10 Apr 2017 06:23)  =============================================  IN: 200 ml / OUT: 0 ml / NET: 200 ml    PE: General: NAD, comfortable lying in bed, sedated, A&Ox2  HEENT: PERRL, EOMI, clear conjunctiva, MMM  Respiratory: CTA B/L, no W/R/R, no retractions  Cardiac: normal +S1/S2, RRR, no M/R/G  GI: soft, NTND, bowel sounds normoactive  Ext: No peripheral edema  Neurologic: A&Ox2, pt sedated non-cooperative in neuro exam. Moving all extremities, hand  strength 5/5.     Labs:             15.5   7.0   )-----------( 190                 43.5     137  |  106  |  18  ----------------------------<  111  4.6   |  22  |  0.88    Ca     8.7       Phos  3.6      Mg     2.1       TPro  6.9  /  Alb  3.6  /  TBili  1.5  /  DBili  0.36  /  AST  19  /  ALT  44 /  AlkPhos  117     MEDICATIONS:  atorvastatin 80milliGRAM(s) Oral at bedtime  pantoprazole    Tablet 40milliGRAM(s) Oral before breakfast  colchicine 0.6milliGRAM(s) Oral daily  allopurinol 300milliGRAM(s) Oral daily  thiamine IVPB 500milliGRAM(s) IV Intermittent every 8 hours  folic acid 1milliGRAM(s) Oral daily  multivitamin 1Tablet(s) Oral daily  aspirin enteric coated 81milliGRAM(s) Oral daily  heparin  Injectable 5000Unit(s) SubCutaneous every 8 hours  QUEtiapine 12.5milliGRAM(s) Oral daily  LORazepam   Injectable 2milliGRAM(s) IV Push every 6 hours PRN Agitation or withdrawal    ASSESSMENT/PLAN: Pt is a 62yo M with PMH of HTN, glaucoma, gout, alcohol abuse and RA, presenting with slurred speech and confusion, found to have R facial droop and word-finding difficulty, now s/p tPA at 6:41pm on 4/7; admitted for post-tpa monitoring in MICU, now with delirium possibly related to alcohol withdrawal/ toxic metabolic encephalopathy.    NEURO  #CVA: Pt presenting with slurred speech and confusion, found to have R facial droop and word-finding difficulty on exam (NIH scale 4). STAT CT head done, found to have no intracranial hemorrorhage; tPA given at 18:41PM on 4/7; admitted for post-tpa monitoring in MICU. Repeat CT head @24hr post-tPA shows no interval change, no hydrocephalus, midline shift, acute intracranial hemorrhage or demarcated territorial infarct. Currently without focal deficits on exam other than AMS.  - PT/OT/Speech consults, f/u recs  - C/w aspirin 81mg daily and Lipitor 80mg po qd.    #Delirium: concern given hx of alcohol abuse (provided by wife), pt was delirious, with visual and auditory hallucinations the past 24hrs.   - Awaiting MRI with sagittal cross-sections to fully evaluate mammary bodies.  - C/w Ativan 2mg IV q6 PRN for agitation/withdrawal symptoms.  - C/w Seroquel 12.5mg po qd. Dose decreased given sedation this AM.  - C/w folic acid, multivitamin, and thiamine.  - Monitor CIWA. CIWA 0 this AM.  - S/p fall related to agitation/sedation this AM. Pt was sitting in chair and then found lying on ground, likely leaned forward, falling and hitting his head. + L sided above eye 2cm in length and 0.5-1cm deep laceration. Awaiting CT head/neck, maxillofacial bones, and cervical spine. Plastic surgery consulted for suturing of forehead laceration.    CV  #HTN: goal SBP <180mmHg. Patient at goal. Holding home Norvasc 5mg po qd.     RHEUM  #Gout: C/w home colchicine and allopurinol    ENDO: Hgb A1c 6.9%, TSH wnl  - C/w ISS    Pulm: no acute issues  Renal: no acute issues  GI: no acute issues  ID: no acute issues  Heme: no acute issues    FEN: DASH/TLC diet (passed dysphagia screening), replete lytes prn for K<4, Mg<2.  PPx: HSQ + SCDs. Protonix.    FULL CODE  Dispo: MICU    Access: peripherals PGY-1 PROGRESS NOTE / TRANSFER NOTE    HOSPITAL COURSE: Pt is a 60yo M with PMH of HTN, glaucoma, gout, alcohol abuse and RA, presenting with slurred speech and confusion, found to have R facial droop and word-finding difficulty on exam (NIH scale 4). STAT CT head done, found to have no intracranial hemorrorhage; tPA given at 18:41PM on 4/7; admitted for post-tpa monitoring in MICU. Repeat CT head @24hr post-tPA shows no interval change, no hydrocephalus, midline shift, acute intracranial hemorrhage or demarcated territorial infarct. Started on ASA and HSQ. Collateral information from wife regarding alcohol abuse and pt's delirious behavior, prompted for initiation of IV thiamine therapy and further evaluation for MRI brain for evaluation of mamillary bodies. Pt with agitation/delirium over the weekend - started on Seroquel and Ativan prn. Vit B12, folate and TSH wnl; awaiting syphilis screen. S/p fall this AM related to agitation/sedation- pt was sitting in chair and then found lying on ground, likely leaned forward, falling and hitting his head. + L sided above eye 2cm in length and 0.5-1cm deep laceration, repaired by plastic surgery. CT head/neck/maxillofacial bones/cervical spine imaging pending (prelim reads negative). Pt medically optimized for step-down to [facility] for further monitoring.    O/N EVENTS: KYMBERLY. Pt received Ativan and Seroquel as scheduled for agitation.  INTERVAL EVENTS: S/p fall related to agitation/sedation this AM. Pt was sitting in chair and then found lying on ground, likely leaned forward, falling and hitting his head. + L sided above eye 2cm in length and 0.5-1cm deep laceration. Awaiting CT head/neck, maxillofacial bones, and cervical spine. Plastic surgery consulted for suturing of forehead laceration.  S: Pt sedated this morning and responds slowly to questions. A&Ox2, unaware of location. Denies HA, N/V, anxiety. CIWA 0.   O: See below for vitals, PE and labs.    ICU Vital Signs Last 24 Hrs  T(F): 98.8, Max: 99 (04-09 @ 18:22)  HR: 96 (68 - 102)  BP: 134/84 (104/64 - 157/76)  BP(mean): 99 (77 - 108)  RR: 26 (14 - 26)  SpO2: 97% (93% - 97%)    I&O's Summary  I & Os for 24h ending 09 Apr 2017 07:00  =============================================  IN: 130 ml / OUT: 925 ml / NET: -795 ml    I & Os for current day (as of 10 Apr 2017 06:23)  =============================================  IN: 200 ml / OUT: 0 ml / NET: 200 ml    PE: General: NAD, comfortable lying in bed, sedated, A&Ox2  HEENT: PERRL, EOMI, clear conjunctiva, MMM  Respiratory: CTA B/L, no W/R/R, no retractions  Cardiac: normal +S1/S2, RRR, no M/R/G  GI: soft, NTND, bowel sounds normoactive  Ext: No peripheral edema  Neurologic: A&Ox2, pt sedated non-cooperative in neuro exam. Moving all extremities, hand  strength 5/5.     Labs:             15.5   7.0   )-----------( 190                 43.5     137  |  106  |  18  ----------------------------<  111  4.6   |  22  |  0.88    Ca     8.7       Phos  3.6      Mg     2.1       TPro  6.9  /  Alb  3.6  /  TBili  1.5  /  DBili  0.36  /  AST  19  /  ALT  44 /  AlkPhos  117     MEDICATIONS:  atorvastatin 80milliGRAM(s) Oral at bedtime  pantoprazole    Tablet 40milliGRAM(s) Oral before breakfast  colchicine 0.6milliGRAM(s) Oral daily  allopurinol 300milliGRAM(s) Oral daily  thiamine IVPB 500milliGRAM(s) IV Intermittent every 8 hours  folic acid 1milliGRAM(s) Oral daily  multivitamin 1Tablet(s) Oral daily  aspirin enteric coated 81milliGRAM(s) Oral daily  heparin  Injectable 5000Unit(s) SubCutaneous every 8 hours  QUEtiapine 12.5milliGRAM(s) Oral daily  LORazepam   Injectable 2milliGRAM(s) IV Push every 6 hours PRN Agitation or withdrawal    ASSESSMENT/PLAN: Pt is a 60yo M with PMH of HTN, glaucoma, gout, alcohol abuse and RA, presenting with slurred speech and confusion, found to have R facial droop and word-finding difficulty, now s/p tPA at 6:41pm on 4/7; admitted for post-tpa monitoring in MICU, now with delirium possibly related to alcohol withdrawal/ toxic metabolic encephalopathy.    NEURO  #CVA: Pt presenting with slurred speech and confusion, found to have R facial droop and word-finding difficulty on exam (NIH scale 4). STAT CT head done, found to have no intracranial hemorrorhage; tPA given at 18:41PM on 4/7; admitted for post-tpa monitoring in MICU. Repeat CT head @24hr post-tPA shows no interval change, no hydrocephalus, midline shift, acute intracranial hemorrhage or demarcated territorial infarct. Currently without focal deficits on exam other than AMS.  - PT/OT/Speech consults, f/u recs  - C/w aspirin 81mg daily and Lipitor 80mg po qd.    #Delirium: concern given hx of alcohol abuse (provided by wife), pt was delirious, with visual and auditory hallucinations the past 24hrs.   - Awaiting MRI with sagittal cross-sections to fully evaluate mammary bodies.  - C/w Ativan 2mg IV q6 PRN for agitation/withdrawal symptoms.  - C/w Seroquel 12.5mg po qd. Dose decreased given sedation this AM.  - C/w folic acid, multivitamin, and thiamine.  - Monitor CIWA. CIWA 0 this AM.  - S/p fall related to agitation/sedation this AM. Pt was sitting in chair and then found lying on ground, likely leaned forward, falling and hitting his head. + L sided above eye 2cm in length and 0.5-1cm deep laceration. Awaiting CT head/neck, maxillofacial bones, and cervical spine. Plastic surgery consulted for suturing of forehead laceration.    CV  #HTN: goal SBP <180mmHg. Patient at goal. Holding home Norvasc 5mg po qd.     RHEUM  #Gout: C/w home colchicine and allopurinol    ENDO: Hgb A1c 6.9%, TSH wnl  - C/w ISS    Pulm: no acute issues  Renal: no acute issues  GI: no acute issues  ID: no acute issues  Heme: no acute issues    FEN: DASH/TLC diet (passed dysphagia screening), replete lytes prn for K<4, Mg<2.  PPx: HSQ + SCDs. Protonix.    FULL CODE  Dispo: MICU    Access: peripherals

## 2017-04-10 NOTE — PROVIDER CONTACT NOTE (FALL NOTIFICATION) - ASSESSMENT
RN and Dr's just outside of room when heard patient fall and chair alarm go off.  patient found on floor with laceration to left eyebrow, and small abrasions to bilat knees.  no loss of consciousness, patient denies feeling altered.  no neuro deficits noted from prior neuro exam.

## 2017-04-11 DIAGNOSIS — E11.9 TYPE 2 DIABETES MELLITUS WITHOUT COMPLICATIONS: ICD-10-CM

## 2017-04-11 DIAGNOSIS — R41.0 DISORIENTATION, UNSPECIFIED: ICD-10-CM

## 2017-04-11 DIAGNOSIS — I10 ESSENTIAL (PRIMARY) HYPERTENSION: ICD-10-CM

## 2017-04-11 PROCEDURE — 93010 ELECTROCARDIOGRAM REPORT: CPT

## 2017-04-11 PROCEDURE — 70551 MRI BRAIN STEM W/O DYE: CPT | Mod: 26

## 2017-04-11 PROCEDURE — 99232 SBSQ HOSP IP/OBS MODERATE 35: CPT

## 2017-04-11 PROCEDURE — 70486 CT MAXILLOFACIAL W/O DYE: CPT | Mod: 26

## 2017-04-11 RX ORDER — THIAMINE MONONITRATE (VIT B1) 100 MG
250 TABLET ORAL DAILY
Qty: 0 | Refills: 0 | Status: DISCONTINUED | OUTPATIENT
Start: 2017-04-11 | End: 2017-04-13

## 2017-04-11 RX ORDER — QUETIAPINE FUMARATE 200 MG/1
12.5 TABLET, FILM COATED ORAL AT BEDTIME
Qty: 0 | Refills: 0 | Status: DISCONTINUED | OUTPATIENT
Start: 2017-04-11 | End: 2017-04-13

## 2017-04-11 RX ORDER — QUETIAPINE FUMARATE 200 MG/1
25 TABLET, FILM COATED ORAL AT BEDTIME
Qty: 0 | Refills: 0 | Status: DISCONTINUED | OUTPATIENT
Start: 2017-04-11 | End: 2017-04-11

## 2017-04-11 RX ORDER — INSULIN LISPRO 100/ML
VIAL (ML) SUBCUTANEOUS
Qty: 0 | Refills: 0 | Status: DISCONTINUED | OUTPATIENT
Start: 2017-04-11 | End: 2017-04-13

## 2017-04-11 RX ADMIN — Medication 1 DROP(S): at 12:06

## 2017-04-11 RX ADMIN — HEPARIN SODIUM 5000 UNIT(S): 5000 INJECTION INTRAVENOUS; SUBCUTANEOUS at 19:02

## 2017-04-11 RX ADMIN — Medication 102.5 MILLIGRAM(S): at 12:11

## 2017-04-11 RX ADMIN — HEPARIN SODIUM 5000 UNIT(S): 5000 INJECTION INTRAVENOUS; SUBCUTANEOUS at 23:12

## 2017-04-11 RX ADMIN — ATORVASTATIN CALCIUM 80 MILLIGRAM(S): 80 TABLET, FILM COATED ORAL at 23:10

## 2017-04-11 RX ADMIN — Medication 0.6 MILLIGRAM(S): at 19:02

## 2017-04-11 RX ADMIN — PANTOPRAZOLE SODIUM 40 MILLIGRAM(S): 20 TABLET, DELAYED RELEASE ORAL at 07:24

## 2017-04-11 RX ADMIN — Medication 1 MILLIGRAM(S): at 11:48

## 2017-04-11 RX ADMIN — LATANOPROST 1 DROP(S): 0.05 SOLUTION/ DROPS OPHTHALMIC; TOPICAL at 23:38

## 2017-04-11 RX ADMIN — MUPIROCIN 1 APPLICATION(S): 20 OINTMENT TOPICAL at 07:30

## 2017-04-11 RX ADMIN — MUPIROCIN 1 APPLICATION(S): 20 OINTMENT TOPICAL at 19:02

## 2017-04-11 RX ADMIN — Medication 1 TABLET(S): at 19:01

## 2017-04-11 RX ADMIN — Medication 300 MILLIGRAM(S): at 11:48

## 2017-04-11 RX ADMIN — Medication 81 MILLIGRAM(S): at 11:47

## 2017-04-11 RX ADMIN — HEPARIN SODIUM 5000 UNIT(S): 5000 INJECTION INTRAVENOUS; SUBCUTANEOUS at 07:24

## 2017-04-11 RX ADMIN — Medication 2 MILLIGRAM(S): at 05:23

## 2017-04-11 NOTE — PHYSICAL THERAPY INITIAL EVALUATION ADULT - MANUAL MUSCLE TESTING RESULTS, REHAB EVAL
B UE and B LE >3+/5 upon functional assessment against gravity Formal assessment to follow when patient able to follow commands

## 2017-04-11 NOTE — PROGRESS NOTE ADULT - SUBJECTIVE AND OBJECTIVE BOX
HOSPITAL COURSE:  The patient is a  60 yo M with HTN, glaucoma, gout, alcohol abuse and RA who presented with slurred speech, confusion, R facial droop and word-finding difficulty (NIH scale 4). He had a STAT CT head, which was negative for intracranial hemorrhage and tPA was given at 18:41PM on 4/7, with subsequent post-tPA monitoring in the MICU. A repeat CT head 24 hrs later showed no change (no hydrocephalus, midline shift, acute intracranial hemorrhage or demarcated territorial infarct). The patient was then started on ASA and HSQ. The patient was then found to exhibit delirious behaviour with agitation, and information about the patient's hx of EtOH abuse was obtained. The patient was then started on IV thiamine therapy, prn ativan and prn Seroquel. The patient was found to have normal B 12, folate, and TSH levels with negative syphilis screen. Over the weekend, the patient was agitated, fell and hit his head. He had a stat CT head/neck/maxillofacial bones/cervical spine, which were negative for acute pathology. He had a 0.5 cm-1 cm depth with 2 cm length laceration above his L eye, and plastics sutured the lac. The patient was stepped down to 7 lach for monitoring, and he remained medically stable, thus he was stepped down to the floors. The patient is awaiting an MRI brain to evaluate his mamillary bodies in the setting of his EtOH use and to further evaluate his presenting symptoms. The patient is also       SUBJECTIVE: Patient seen and examined at bedside.     OBJECTIVE:    VITAL SIGNS:  ICU Vital Signs Last 24 Hrs  T(C): 37.1, Max: 37.2 (04-10 @ 23:23)  T(F): 98.8, Max: 98.9 (04-10 @ 23:23)  HR: 98 (82 - 102)  BP: 125/74 (103/71 - 146/84)  BP(mean): 93 (86 - 106)  ABP: --  ABP(mean): --  RR: 27 (19 - 27)  SpO2: 98% (94% - 98%)        I & Os for current day (as of 04-11 @ 17:27)  =============================================  IN: 950 ml / OUT: 450 ml / NET: 500 ml    CAPILLARY BLOOD GLUCOSE      PHYSICAL EXAM:  PE: General: NAD, comfortable lying in bed, A&Ox2  HEENT: PERRL, b/l ophthalmoplegia prominent with upward gaze, clear conjunctiva, MMM  Respiratory: CTA B/L, no W/R/R, no retractions  Cardiac: normal +S1/S2, RRR, no M/R/G  GI: soft, NTND, bowel sounds normoactive  Ext: No peripheral edema  Neurologic: A&Ox2 (knows self, month, not year, does not know which hospital. Pt moving all extremities, hand  strength 5/5 bilaterally, foot dorsiflexion 5/5 bilaterally, can lift both legs off the bed. Face symmetric. Mild dysarthria. Sensation to light touch intact throughout.    MEDICATIONS:  MEDICATIONS  (STANDING):  atorvastatin 80milliGRAM(s) Oral at bedtime  pantoprazole    Tablet 40milliGRAM(s) Oral before breakfast  colchicine 0.6milliGRAM(s) Oral daily  allopurinol 300milliGRAM(s) Oral daily  folic acid 1milliGRAM(s) Oral daily  multivitamin 1Tablet(s) Oral daily  aspirin enteric coated 81milliGRAM(s) Oral daily  heparin  Injectable 5000Unit(s) SubCutaneous every 8 hours  latanoprost 0.005% Ophthalmic Solution 1Drop(s) Left EYE at bedtime  timolol 0.5% Solution 1Drop(s) Right EYE daily  mupirocin 2% Ointment 1Application(s) Topical three times a day  thiamine IVPB 250milliGRAM(s) IV Intermittent daily    MEDICATIONS  (PRN):  LORazepam   Injectable 2milliGRAM(s) IV Push every 6 hours PRN Agitation or withdrawal  QUEtiapine 25milliGRAM(s) Oral at bedtime PRN Agitation      ALLERGIES:  Allergies    No Known Allergies    Intolerances        LABS:    04-10    137  |  106  |  18  ----------------------------<  111<H>  4.6   |  22  |  0.88    Ca    8.7      10 Apr 2017 06:23  Phos  3.6     04-10  Mg     2.1     04-10            RADIOLOGY & ADDITIONAL TESTS: Reviewed. HOSPITAL COURSE:  The patient is a  60 yo M with HTN, glaucoma, gout, alcohol abuse and RA who presented with slurred speech, confusion, R facial droop and word-finding difficulty (NIH scale 4). He had a STAT CT head, which was negative for intracranial hemorrhage and tPA was given at 18:41PM on 4/7, with subsequent post-tPA monitoring in the MICU. A repeat CT head 24 hrs later showed no change (no hydrocephalus, midline shift, acute intracranial hemorrhage or demarcated territorial infarct). The patient was then started on ASA and HSQ. The patient was then found to exhibit delirious behaviour with agitation, and information about the patient's hx of EtOH abuse was obtained. The patient was then started on IV thiamine therapy, prn ativan and prn Seroquel. The patient was found to have normal B 12, folate, and TSH levels with negative syphilis screen. Over the weekend, the patient was agitated, fell and hit his head. He had a stat CT head/neck/maxillofacial bones/cervical spine, which were negative for acute pathology. He had a 0.5 cm-1 cm depth with 2 cm length laceration above his L eye, and plastics sutured the lac. The patient was stepped down to 7 lach for monitoring, and he remained medically stable, thus he was stepped down to the floors. The patient is awaiting an MRI brain to evaluate his mamillary bodies in the setting of his EtOH use and to further evaluate his presenting symptoms. The patient was stable and stepped down to the [facility].    SUBJECTIVE: Patient seen and examined at bedside.     OBJECTIVE:    VITAL SIGNS:  ICU Vital Signs Last 24 Hrs  T(C): 37.1, Max: 37.2 (04-10 @ 23:23)  T(F): 98.8, Max: 98.9 (04-10 @ 23:23)  HR: 98 (82 - 102)  BP: 125/74 (103/71 - 146/84)  BP(mean): 93 (86 - 106)  ABP: --  ABP(mean): --  RR: 27 (19 - 27)  SpO2: 98% (94% - 98%)        I & Os for current day (as of 04-11 @ 17:27)  =============================================  IN: 950 ml / OUT: 450 ml / NET: 500 ml    CAPILLARY BLOOD GLUCOSE      PHYSICAL EXAM:  PE: General: NAD, comfortable lying in bed, A&Ox2  HEENT: PERRL, b/l ophthalmoplegia prominent with upward gaze, clear conjunctiva, MMM  Respiratory: CTA B/L, no W/R/R, no retractions  Cardiac: normal +S1/S2, RRR, no M/R/G  GI: soft, NTND, bowel sounds normoactive  Ext: No peripheral edema  Neurologic: A&Ox2 (knows self, month, not year, does not know which hospital. Pt moving all extremities, hand  strength 5/5 bilaterally, foot dorsiflexion 5/5 bilaterally, can lift both legs off the bed. Face symmetric. Mild dysarthria. Sensation to light touch intact throughout.    MEDICATIONS:  MEDICATIONS  (STANDING):  atorvastatin 80milliGRAM(s) Oral at bedtime  pantoprazole    Tablet 40milliGRAM(s) Oral before breakfast  colchicine 0.6milliGRAM(s) Oral daily  allopurinol 300milliGRAM(s) Oral daily  folic acid 1milliGRAM(s) Oral daily  multivitamin 1Tablet(s) Oral daily  aspirin enteric coated 81milliGRAM(s) Oral daily  heparin  Injectable 5000Unit(s) SubCutaneous every 8 hours  latanoprost 0.005% Ophthalmic Solution 1Drop(s) Left EYE at bedtime  timolol 0.5% Solution 1Drop(s) Right EYE daily  mupirocin 2% Ointment 1Application(s) Topical three times a day  thiamine IVPB 250milliGRAM(s) IV Intermittent daily    MEDICATIONS  (PRN):  LORazepam   Injectable 2milliGRAM(s) IV Push every 6 hours PRN Agitation or withdrawal  QUEtiapine 25milliGRAM(s) Oral at bedtime PRN Agitation      ALLERGIES:  Allergies    No Known Allergies    Intolerances        LABS:    04-10    137  |  106  |  18  ----------------------------<  111<H>  4.6   |  22  |  0.88    Ca    8.7      10 Apr 2017 06:23  Phos  3.6     04-10  Mg     2.1     04-10            RADIOLOGY & ADDITIONAL TESTS: Reviewed. HOSPITAL COURSE:  The patient is a  62 yo M with HTN, glaucoma, gout, alcohol abuse and RA who presented with slurred speech, confusion, R facial droop and word-finding difficulty (NIH scale 4). He had a STAT CT head, which was negative for intracranial hemorrhage and tPA was given at 18:41PM on 4/7, with subsequent post-tPA monitoring in the MICU. A repeat CT head 24 hrs later showed no change (no hydrocephalus, midline shift, acute intracranial hemorrhage or demarcated territorial infarct). The patient was then started on ASA and HSQ. The patient was then found to exhibit delirious behaviour with agitation, and information about the patient's hx of EtOH abuse was obtained. The patient was then started on IV thiamine therapy, prn ativan and prn Seroquel. The patient was found to have normal B 12, folate, and TSH levels with negative syphilis screen. Over the weekend, the patient was agitated, fell and hit his head. He had a stat CT head/neck/maxillofacial bones/cervical spine, which were negative for acute pathology. He had a 0.5 cm-1 cm depth with 2 cm length laceration above his L eye, and plastics sutured the lac. The patient was stepped down to 7 lach for monitoring, and he remained medically stable, thus he was stepped down to the floors. The patient is awaiting an MRI brain to evaluate his mamillary bodies in the setting of his EtOH use and to further evaluate his presenting symptoms.    SUBJECTIVE: Patient seen and examined at bedside.     OBJECTIVE:    VITAL SIGNS:  ICU Vital Signs Last 24 Hrs  T(C): 37.1, Max: 37.2 (04-10 @ 23:23)  T(F): 98.8, Max: 98.9 (04-10 @ 23:23)  HR: 98 (82 - 102)  BP: 125/74 (103/71 - 146/84)  BP(mean): 93 (86 - 106)  ABP: --  ABP(mean): --  RR: 27 (19 - 27)  SpO2: 98% (94% - 98%)        I & Os for current day (as of 04-11 @ 17:27)  =============================================  IN: 950 ml / OUT: 450 ml / NET: 500 ml    CAPILLARY BLOOD GLUCOSE      PHYSICAL EXAM:  PE: General: NAD, comfortable lying in bed, A&Ox2  HEENT: PERRL, b/l ophthalmoplegia prominent with upward gaze, clear conjunctiva, MMM  Respiratory: CTA B/L, no W/R/R, no retractions  Cardiac: normal +S1/S2, RRR, no M/R/G  GI: soft, NTND, bowel sounds normoactive  Ext: No peripheral edema  Neurologic: A&Ox2 (knows self, month, not year, does not know which hospital. Pt moving all extremities, hand  strength 5/5 bilaterally, foot dorsiflexion 5/5 bilaterally, can lift both legs off the bed. Face symmetric. Mild dysarthria. Sensation to light touch intact throughout.    MEDICATIONS:  MEDICATIONS  (STANDING):  atorvastatin 80milliGRAM(s) Oral at bedtime  pantoprazole    Tablet 40milliGRAM(s) Oral before breakfast  colchicine 0.6milliGRAM(s) Oral daily  allopurinol 300milliGRAM(s) Oral daily  folic acid 1milliGRAM(s) Oral daily  multivitamin 1Tablet(s) Oral daily  aspirin enteric coated 81milliGRAM(s) Oral daily  heparin  Injectable 5000Unit(s) SubCutaneous every 8 hours  latanoprost 0.005% Ophthalmic Solution 1Drop(s) Left EYE at bedtime  timolol 0.5% Solution 1Drop(s) Right EYE daily  mupirocin 2% Ointment 1Application(s) Topical three times a day  thiamine IVPB 250milliGRAM(s) IV Intermittent daily    MEDICATIONS  (PRN):  LORazepam   Injectable 2milliGRAM(s) IV Push every 6 hours PRN Agitation or withdrawal  QUEtiapine 25milliGRAM(s) Oral at bedtime PRN Agitation      ALLERGIES:  Allergies    No Known Allergies    Intolerances        LABS:    04-10    137  |  106  |  18  ----------------------------<  111<H>  4.6   |  22  |  0.88    Ca    8.7      10 Apr 2017 06:23  Phos  3.6     04-10  Mg     2.1     04-10            RADIOLOGY & ADDITIONAL TESTS: Reviewed.

## 2017-04-11 NOTE — PROGRESS NOTE ADULT - ASSESSMENT
Pt is a 62yo M with PMH of HTN, glaucoma, gout, alcohol abuse and RA, presenting with slurred speech and confusion, found to have R facial droop and word-finding difficulty, now s/p tPA at 6:41pm on 4/7; admitted for post-tpa monitoring in MICU, c/b delirium possibly related to alcohol withdrawal/ toxic metabolic encephalopathy, currently improving.

## 2017-04-11 NOTE — PHYSICAL THERAPY INITIAL EVALUATION ADULT - FOLLOWS COMMANDS/ANSWERS QUESTIONS, REHAB EVAL
25% of the time/able to follow multistep instructions/intermittently expressed bouts of unintelligible speech unrelated to conversation unable to follow commands/unable to answer questions/25% of the time/intermittently expressed bouts of unintelligible speech unrelated to conversation/unable to follow multi-step instructions

## 2017-04-11 NOTE — PROGRESS NOTE ADULT - SUBJECTIVE AND OBJECTIVE BOX
Physical Medicine and Rehabilitation Progress Note    MARIO MAY    MRN-[id number]    Patient is a 61y old  Male who presents with a chief complaint of Slurred speech (07 Apr 2017 22:57)      Vital Signs Last 24 Hrs  T(C): 36.9, Max: 37.2 (04-10 @ 23:23)  T(F): 98.5, Max: 98.9 (04-10 @ 23:23)  HR: 82 (82 - 102)  BP: 140/75 (103/71 - 146/84)  BP(mean): 98 (86 - 122)  RR: 20 (17 - 25)  SpO2: 98% (94% - 98%)    Current Functional Status in Physical Therapy:lying in bed. confused. stable. Deconditioned, 4-4+/5 in uppers, 3+-4/5 in lowers, not accurate. No focal deficits. Fell out of chair yesterday. CT head and cervical spine; no acute pathology.    Bed mobility; moderate  assistance    Transfers:maximal assistance, poor balance.    Ambulation:      Impression:  2. Deconditioned. 2. ? CVA/TIA. 3. Alcohol withdrowal.      Recommendations:1. PT/OT. 2. Subacute rehab. placement

## 2017-04-11 NOTE — PHYSICAL THERAPY INITIAL EVALUATION ADULT - IMPAIRMENTS CONTRIBUTING TO GAIT DEVIATIONS, PT EVAL
cognition/impaired balance/impaired motor control/narrow base of support/impaired coordination/decreased strength

## 2017-04-11 NOTE — PHYSICAL THERAPY INITIAL EVALUATION ADULT - DIAGNOSIS, PT EVAL
5E: Impaired Motor Function and Sensory Integrity Associated with Progressive Disorders of the Central Nervous System

## 2017-04-11 NOTE — OCCUPATIONAL THERAPY INITIAL EVALUATION ADULT - MD ORDER
60yo M with PMHx of HTN, glaucoma, gout, and RA, presents to the ED BIBEMS after co-workers noticed that patient was having slurred speech, and looked confused. Presents with slurred speech and confusion, found to have R facial droop and word-finding difficulty on exam (NIH scale 4). tPA given at 18:41PM on 4/7, course complicated by agitation/delerium 2/2 EtOH withdrawal and fall from chair in MICU (CT cervical spine negative).

## 2017-04-11 NOTE — PROGRESS NOTE ADULT - PROBLEM SELECTOR PLAN 1
Pt presenting with slurred speech and confusion, found to have R facial droop and word-finding difficulty on exam (NIH scale 4). STAT CT head done, found to have no intracranial hemorrhage; tPA given at 18:41PM on 4/7; admitted for post-tpa monitoring in MICU. Repeat CT head @24hr post-tPA showed no interval change, no hydrocephalus, midline shift, acute intracranial hemorrhage or demarcated territorial infarct. Currently without focal deficits on exam other than AMS.  - PT/OT:  f/u recs  - C/w aspirin 81mg daily and Lipitor 80mg po qd.  -ordered for MRI to evaluate possible infarction

## 2017-04-11 NOTE — PROGRESS NOTE ADULT - PROBLEM SELECTOR PLAN 2
Concern given hx of alcohol abuse (provided by wife), pt was delirious, with visual and auditory hallucinations for >24 hours.  - Awaiting MRI with sagittal cross-sections to fully evaluate mammary bodies.  - C/w Ativan 2mg IV q6 PRN for agitation/withdrawal symptoms.  - C/w Seroquel 12.5mg po qd. Dose decreased given sedation yesterday AM.  - C/w folic acid, multivitamin. On Wernicke encephalopathy treatment dose of Thiamin.  - Monitor CIWA  - S/p fall related to agitation/sedation yesterday AM. Pt was sitting in chair and then found lying on ground, likely leaned forward, falling and hitting his head. + L sided above eye 2cm in length and 0.5-1cm deep laceration. CT head/neck negative. Plastic surgery consulted, sutured forehead laceration.  -will need outpatient dementia w/u

## 2017-04-11 NOTE — PROGRESS NOTE ADULT - SUBJECTIVE AND OBJECTIVE BOX
PGY-1 Transfer Accept Note    Hospital Course:  Pt is a 62yo M with PMH of HTN, glaucoma, gout, alcohol abuse and RA, presenting with slurred speech and confusion, found to have R facial droop and word-finding difficulty on exam (NIH scale 4). STAT CT head done, found to have no intracranial hemorrorhage; tPA given at 18:41PM on 4/7; admitted for post-tpa monitoring in MICU. Repeat CT head @24hr post-tPA shows no interval change, no hydrocephalus, midline shift, acute intracranial hemorrhage or demarcated territorial infarct. Started on ASA and HSQ. Collateral information from wife regarding alcohol abuse and pt's delirious behavior, prompted for initiation of IV thiamine therapy and further evaluation for MRI brain for evaluation of mamillary bodies. Pt with agitation/delirium over the weekend - started on Seroquel and Ativan prn. Vit B12, folate and TSH wnl; awaiting syphilis screen. S/p fall this AM related to agitation/sedation- pt was sitting in chair and then found lying on ground, likely leaned forward, falling and hitting his head. + L sided above eye 2cm in length and 0.5-1cm deep laceration, repaired by plastic surgery. CT head/neck/maxillofacial bones/cervical spine imaging pending (prelim reads negative). Pt medically optimized for step-down to [facility] for further monitoring.    INTERVAL EVENTS: S/p fall related to agitation/sedation this AM. Pt was sitting in chair and then found lying on ground, likely leaned forward, falling and hitting his head. + L sided above eye 2cm in length and 0.5-1cm deep laceration. Awaiting CT head/neck, maxillofacial bones, and cervical spine. Plastic surgery consulted for suturing of forehead laceration.    Subjective:    Objective:   VITAL SIGNS:  T(F): 98.9  HR: 97  BP: 146/84  RR: 20  SpO2: 96%  Wt(kg): --    PHYSICAL EXAM:    Constitutional: WDWN, NAD,   Eyes: PERRL, EOMI, sclera non-icteric  Neck: supple, no masses, no JVD  Respiratory: CTA b/l, good air entry b/l, no wheezing, rhonchi, rales, with normal respiratory effort and no intercostal retractions  Cardiovascular: RRR, normal S1S2, no M/R/G  Gastrointestinal: soft, NTND, no masses palpable, BS normal in all four quadrants, no HSM  Extremities: WWM, no c/c/e  Neurological: AAOx3  Skin: Normal temperature    MEDICATIONS  (STANDING):  atorvastatin 80milliGRAM(s) Oral at bedtime  pantoprazole    Tablet 40milliGRAM(s) Oral before breakfast  colchicine 0.6milliGRAM(s) Oral daily  allopurinol 300milliGRAM(s) Oral daily  thiamine IVPB 500milliGRAM(s) IV Intermittent every 8 hours  folic acid 1milliGRAM(s) Oral daily  multivitamin 1Tablet(s) Oral daily  aspirin enteric coated 81milliGRAM(s) Oral daily  heparin  Injectable 5000Unit(s) SubCutaneous every 8 hours  QUEtiapine 12.5milliGRAM(s) Oral daily  latanoprost 0.005% Ophthalmic Solution 1Drop(s) Left EYE at bedtime  timolol 0.5% Solution 1Drop(s) Right EYE daily  mupirocin 2% Ointment 1Application(s) Topical three times a day    MEDICATIONS  (PRN):  LORazepam   Injectable 2milliGRAM(s) IV Push every 6 hours PRN Agitation or withdrawal      Allergies    No Known Allergies    Intolerances        LABS:    04-10    137  |  106  |  18  ----------------------------<  111<H>  4.6   |  22  |  0.88    Ca    8.7      10 Apr 2017 06:23  Phos  3.6     04-10  Mg     2.1     04-10    TPro  6.9  /  Alb  3.6  /  TBili  1.5<H>  /  DBili  0.36<H>  /  AST  19  /  ALT  44<H>  /  AlkPhos  117  04-09          RADIOLOGY & ADDITIONAL TESTS: PGY-1 Transfer Accept Note    Hospital Course:  Pt is a 62yo M with PMH of HTN, glaucoma, gout, alcohol abuse and RA, presenting with slurred speech and confusion, found to have R facial droop and word-finding difficulty on exam (NIH scale 4). STAT CT head done, found to have no intracranial hemorrorhage; tPA given at 18:41PM on 4/7; admitted for post-tpa monitoring in MICU. Repeat CT head @24hr post-tPA shows no interval change, no hydrocephalus, midline shift, acute intracranial hemorrhage or demarcated territorial infarct. Started on ASA and HSQ. Collateral information from wife regarding alcohol abuse and pt's delirious behavior, prompted for initiation of IV thiamine therapy and further evaluation for MRI brain for evaluation of mamillary bodies. Pt with agitation/delirium over the weekend - started on Seroquel and Ativan prn. Vit B12, folate and TSH wnl; awaiting syphilis screen. S/p fall this AM related to agitation/sedation- pt was sitting in chair and then found lying on ground, likely leaned forward, falling and hitting his head. + L sided above eye 2cm in length and 0.5-1cm deep laceration, repaired by plastic surgery. CT head/neck/maxillofacial bones/cervical spine imaging negative. Plastic surgery consulted for suturing of forehead laceration. Pt medically optimized for step-down to [facility] for further monitoring.    Subjective: Patient has no complaints. States he feels well. Denies headache, vision changes, numbness/tinging/weakness.     Objective:   VITAL SIGNS:  T(F): 98.9  HR: 97  BP: 146/84  RR: 20  SpO2: 96%  Wt(kg): --    PHYSICAL EXAM:  PE: General: NAD, comfortable lying in bed, A&Ox2  HEENT: PERRL, EOMI, clear conjunctiva, MMM  Respiratory: CTA B/L, no W/R/R, no retractions  Cardiac: normal +S1/S2, RRR, no M/R/G  GI: soft, NTND, bowel sounds normoactive  Ext: No peripheral edema  Neurologic: A&Ox2 (knows self, month, said year was 1917, knew we were in a hospital but said incorrect one). Pt moving all extremities, hand  strength 5/5 bilaterally, foot dorsiflexion 5/5 bilaterally, can lift both legs off the bed. Face symmetric. Mild dysarthria. Sensation to light touch intact throughout. Patient answers most questions appropriately, but asked if we were going "back to the basement where the beer is"    MEDICATIONS  (STANDING):  atorvastatin 80milliGRAM(s) Oral at bedtime  pantoprazole    Tablet 40milliGRAM(s) Oral before breakfast  colchicine 0.6milliGRAM(s) Oral daily  allopurinol 300milliGRAM(s) Oral daily  thiamine IVPB 500milliGRAM(s) IV Intermittent every 8 hours  folic acid 1milliGRAM(s) Oral daily  multivitamin 1Tablet(s) Oral daily  aspirin enteric coated 81milliGRAM(s) Oral daily  heparin  Injectable 5000Unit(s) SubCutaneous every 8 hours  QUEtiapine 12.5milliGRAM(s) Oral daily  latanoprost 0.005% Ophthalmic Solution 1Drop(s) Left EYE at bedtime  timolol 0.5% Solution 1Drop(s) Right EYE daily  mupirocin 2% Ointment 1Application(s) Topical three times a day    MEDICATIONS  (PRN):  LORazepam   Injectable 2milliGRAM(s) IV Push every 6 hours PRN Agitation or withdrawal      Allergies    No Known Allergies    Intolerances        LABS:    04-10    137  |  106  |  18  ----------------------------<  111<H>  4.6   |  22  |  0.88    Ca    8.7      10 Apr 2017 06:23  Phos  3.6     04-10  Mg     2.1     04-10    TPro  6.9  /  Alb  3.6  /  TBili  1.5<H>  /  DBili  0.36<H>  /  AST  19  /  ALT  44<H>  /  AlkPhos  117  04-09

## 2017-04-11 NOTE — PROGRESS NOTE ADULT - PROBLEM SELECTOR PLAN 1
Pt presenting with slurred speech and confusion, found to have R facial droop and word-finding difficulty on exam (NIH scale 4). STAT CT head done, found to have no intracranial hemorrhage; tPA given at 18:41PM on 4/7; admitted for post-tpa monitoring in MICU. Repeat CT head @24hr post-tPA showed no interval change, no hydrocephalus, midline shift, acute intracranial hemorrhage or demarcated territorial infarct. Currently without focal deficits on exam other than AMS    - PT/OT:  f/u recs  - aspirin 81mg daily and Lipitor 80mg po qd.  - f/u MRI brain

## 2017-04-11 NOTE — OCCUPATIONAL THERAPY INITIAL EVALUATION ADULT - MANUAL MUSCLE TESTING RESULTS, REHAB EVAL
bilateral upper extremities observed to be >3+/5, unable to complete formal MMT at this time 2/2 decreased command following

## 2017-04-11 NOTE — PHYSICAL THERAPY INITIAL EVALUATION ADULT - RANGE OF MOTION EXAMINATION, REHAB EVAL
bilateral shoulder flexion <1/2 range AROM; WFL ZORAIDA DAMON to be formally assessed however WFL through functional assessment

## 2017-04-11 NOTE — OCCUPATIONAL THERAPY INITIAL EVALUATION ADULT - GENERAL OBSERVATIONS, REHAB EVAL
Right hand dominant. Chart reviewed, patient cleared for OT eval by CATARINA Jacobo and medical team. Received in side-lying on bed pan, +tele, +heplock.

## 2017-04-11 NOTE — PHYSICAL THERAPY INITIAL EVALUATION ADULT - PLANNED THERAPY INTERVENTIONS, PT EVAL
motor coordination training/balance training/gait training/strengthening/bed mobility training/transfer training/postural re-education

## 2017-04-11 NOTE — PROGRESS NOTE ADULT - ASSESSMENT
Pt is a 60yo M with PMH of HTN, glaucoma, gout, alcohol abuse and RA, presenting with slurred speech and confusion, found to have R facial droop and word-finding difficulty, now s/p tPA at 6:41pm on 4/7; admitted for post-tpa monitoring in MICU, now with delirium possibly related to alcohol withdrawal/ toxic metabolic encephalopathy.

## 2017-04-11 NOTE — PROGRESS NOTE ADULT - ASSESSMENT
Pt is a 60yo M with HTN, glaucoma, gout, alcohol abuse and RA who presented with slurred speech, confusion, R facial droop and word-finding difficulty (NIH scale 4), now s/p tPA on 4/7, course complicated by delirium possibly 2/2 alcohol withdrawal/toxic metabolic encephalopathy, currently improved, stepped down to [facility] and now stepped down to [facility].

## 2017-04-11 NOTE — PROGRESS NOTE ADULT - PROBLEM SELECTOR PLAN 2
Concern given hx of alcohol abuse (provided by wife), pt was delirious, with visual and auditory hallucinations for >24 hours.    - Awaiting MRI with sagittal cross-sections to fully evaluate mammary bodies.  - Ativan 2mg IV q6 PRN for agitation/withdrawal symptoms.  - Seroquel 12.5mg po qd. Dose decreased given sedation yesterday AM.  - folic acid, multivitamin. On Wernicke encephalopathy treatment dose of Thiamin ( mg QD x 4 days)  - Monitor CIWA  - S/p fall related to agitation/sedation yesterday AM. Pt was sitting in chair and then found lying on ground, likely leaned forward, falling and hitting his head. + L sided above eye 2cm in length and 0.5-1cm deep laceration. CT head/neck negative. Plastic surgery consulted, sutured forehead laceration.  -will need outpatient dementia followup

## 2017-04-11 NOTE — PROGRESS NOTE ADULT - PROBLEM SELECTOR PLAN 1
Pt presenting with slurred speech and confusion, found to have R facial droop and word-finding difficulty on exam (NIH scale 4). STAT CT head done, found to have no intracranial hemorrorhage; tPA given at 18:41PM on 4/7; admitted for post-tpa monitoring in MICU. Repeat CT head @24hr post-tPA showed no interval change, no hydrocephalus, midline shift, acute intracranial hemorrhage or demarcated territorial infarct. Currently without focal deficits on exam other than AMS.  - PT/OT/Speech consults, f/u recs  - C/w aspirin 81mg daily and Lipitor 80mg po qd. Pt presenting with slurred speech and confusion, found to have R facial droop and word-finding difficulty on exam (NIH scale 4). STAT CT head done, found to have no intracranial hemorrhage; tPA given at 18:41PM on 4/7; admitted for post-tpa monitoring in MICU. Repeat CT head @24hr post-tPA showed no interval change, no hydrocephalus, midline shift, acute intracranial hemorrhage or demarcated territorial infarct. Currently without focal deficits on exam other than AMS.  - PT/OT/Speech consults, f/u recs  - C/w aspirin 81mg daily and Lipitor 80mg po qd.

## 2017-04-11 NOTE — PROGRESS NOTE ADULT - SUBJECTIVE AND OBJECTIVE BOX
CHIEF COMPLAINT:  aphasic stroke s/p IV TPA    INTERVAL HISTORY:  Patient asleep upon exam - given 2 mg ativan for agitation. As per wife he has not had anything to drink since last saturday but has a significant h/o poor cognitive function. He is unable to drink more than a few drinks and gets drunk easily. Also several month h/o difficulty spatially and finding his way to his new house even though they live 5 door down from the old residence. Also often leaves the bar accidentally by the wrong door.     REVIEW OF SYSTEMS:  As per HPI, otherwise negative for Constitutional, Eyes, Ears/Nose/Mouth/Throat, Neck, Cardiovascular, Respiratory, Gastrointestinal, Genitourinary, Skin, Endocrine, Musculoskeletal, Psychiatric, and Hematologic/Lymphatic.    MEDICATIONS:  atorvastatin 80milliGRAM(s) Oral at bedtime  pantoprazole    Tablet 40milliGRAM(s) Oral before breakfast  colchicine 0.6milliGRAM(s) Oral daily  allopurinol 300milliGRAM(s) Oral daily  folic acid 1milliGRAM(s) Oral daily  multivitamin 1Tablet(s) Oral daily  LORazepam   Injectable 2milliGRAM(s) IV Push every 6 hours PRN  aspirin enteric coated 81milliGRAM(s) Oral daily  heparin  Injectable 5000Unit(s) SubCutaneous every 8 hours  latanoprost 0.005% Ophthalmic Solution 1Drop(s) Left EYE at bedtime  timolol 0.5% Solution 1Drop(s) Right EYE daily  mupirocin 2% Ointment 1Application(s) Topical three times a day  thiamine IVPB 250milliGRAM(s) IV Intermittent daily  QUEtiapine 25milliGRAM(s) Oral at bedtime PRN    VITAL SIGNS:  Vital Signs Last 24 Hrs  T(C): 37.1, Max: 37.2 (04-10 @ 23:23)  T(F): 98.8, Max: 98.9 (04-10 @ 23:23)  HR: 98 (82 - 102)  BP: 125/74 (103/71 - 146/84)  BP(mean): 93 (86 - 106)  RR: 27 (19 - 27)  SpO2: 98% (94% - 98%)    PHYSICAL EXAMINATION:  General: Well-developed, well nourished,sedated  Eyes: Conjunctiva and sclera clear.  Cardiovascular: Regular rate and rhythm; S1 and S2 Normal; No murmurs, gallops or rubs.  Neurologic:  - Mental Status:  Sedated. Not arousable  - Cranial Nerves II-XII:  intact to grimace and no asymetry  -   LABS:      04-10    137  |  106  |  18  ----------------------------<  111<H>  4.6   |  22  |  0.88    Ca    8.7      10 Apr 2017 06:23  Phos  3.6     04-10  Mg     2.1     04-10            RADIOLOGY & ADDITIONAL STUDIES:      IMPRESSION & PLAN:  Acute aphasic stroke :  Patient is s/p IV TPA. Follow up CT shows no bleed.   Agitation - h/o cognitive impairment - most likely secondary to dementia and etoh abuse - recommend less use of ativan and to start on seroquel from sunday pm.   Thiamine and folate started.

## 2017-04-11 NOTE — PHYSICAL THERAPY INITIAL EVALUATION ADULT - PERTINENT HX OF CURRENT PROBLEM, REHAB EVAL
62 yo male presented with slurred speech, confusion, right facial droop, word-finding difficulty s/p tPA on 4/7 for possible stroke, complicated by EtOH withdrawal and s/p fall from chair in MICU negative CT cervical spine cleared to work with PT/OT for dual evaluation co-tx

## 2017-04-11 NOTE — PHYSICAL THERAPY INITIAL EVALUATION ADULT - IMPAIRMENTS FOUND, PT EVAL
muscle strength/decreased midline orientation/visual motor/aerobic capacity/endurance/arousal, attention, and cognition/poor safety awareness/posture/gait, locomotion, and balance

## 2017-04-11 NOTE — PHYSICAL THERAPY INITIAL EVALUATION ADULT - TRANSFER SAFETY CONCERNS NOTED: SIT/STAND, REHAB EVAL
decreased step length/decreased safety awareness/losing balance/decreased sequencing ability/decreased weight-shifting ability

## 2017-04-11 NOTE — PROGRESS NOTE ADULT - SUBJECTIVE AND OBJECTIVE BOX
Stroke Neurology Follow-Up Visit    [facility] [address] [place], [place] [address] No acute events overnight.   Pt seen and examined.     Exam:  T(F): 98.9, Max: 98.9 (04-10 @ 23:23)  HR: 84 (82 - 102)  BP: 112/68 (103/71 - 149/84)  RR: 21 (15 - 25)  SpO2: 96% (93% - 97%)    Gen: No acute distress, left forehead (at eyebrow) swelling and sutured laceration.  Neuro:  Mental status: awake, alert, occasional nonsensical speech, follows commands, oriented x 2.  Not cooperative with portions of exam.  Tangential.  Dysarthric.    Cranial nerves: Pupils equally round and reactive to light, visual fields full, no nystagmus, extraocular muscles intact, V1 through V3 intact bilaterally and symmetric, face symmetric, hearing intact to finger rub, palate elevation symmetric, tongue was midline, sternocleidomastoid/shoulder shrug strength bilaterally 5/5.    Motor:  Normal bulk and tone, strength 5/5 in bilateral upper and lower extremities.   strength 5/5.  SANTO- did not cooperate.   Sensation: Intact to light touch.  No neglect.   Coordination: Did not cooperate.  Reflexes: 2+ in upper and lower extremities, absent Babinski bilaterally    MEDICATIONS  (STANDING):  atorvastatin 80milliGRAM(s) Oral at bedtime  pantoprazole    Tablet 40milliGRAM(s) Oral before breakfast  colchicine 0.6milliGRAM(s) Oral daily  allopurinol 300milliGRAM(s) Oral daily  folic acid 1milliGRAM(s) Oral daily  multivitamin 1Tablet(s) Oral daily  aspirin enteric coated 81milliGRAM(s) Oral daily  heparin  Injectable 5000Unit(s) SubCutaneous every 8 hours  QUEtiapine 12.5milliGRAM(s) Oral daily  latanoprost 0.005% Ophthalmic Solution 1Drop(s) Left EYE at bedtime  timolol 0.5% Solution 1Drop(s) Right EYE daily  mupirocin 2% Ointment 1Application(s) Topical three times a day  thiamine IVPB 250milliGRAM(s) IV Intermittent daily    MEDICATIONS  (PRN):  LORazepam   Injectable 2milliGRAM(s) IV Push every 6 hours PRN Agitation or withdrawal      Labs:    04-10    137  |  106  |  18  ----------------------------<  111<H>  4.6   |  22  |  0.88    Ca    8.7      10 Apr 2017 06:23  Phos  3.6     04-10  Mg     2.1     04-10    Thyroid Stimulating Hormone, Serum: 2.515 uIU/mL  Hemoglobin A1C, Whole Blood: 6.9 %  Cholesterol, Serum: 177 mg/dL  Triglycerides, Serum: 355 mg/dL  LDL 68   HDL Cholesterol, Serum: 38 mg/dL    Radiology:  CT head   IMPRESSION:   1. No evidence of acute intracranial hemorrhage and no apparent acute   abnormality.  2. Parenchymal volume loss and chronic microangiopathic disease, stable.    CTA head/neck     IMPRESSION:   No hemodynamically significant carotid or vertebral artery stenosis. Left   vertebral artery is dominant.  IMPRESSION: Intracranial CTA demonstrates no proximal vessel occlusion or   high-grade stenosis about the [place].    Assessment & Plan: Stroke Neurology Follow-Up Visit    [facility] overnight.   Pt seen and examined.   Currently without complaints. Wishes to return to sleep.  Denies headache, nausea, vomiting, weakness, or numbness.     Exam:  T(F): 98.9, Max: 98.9 (04-10 @ 23:23)  HR: 84 (82 - 102)  BP: 112/68 (103/71 - 149/84)  RR: 21 (15 - 25)  SpO2: 96% (93% - 97%)    Gen: No acute distress, left forehead (at eyebrow) swelling and sutured laceration-slightly ecchymotic   Neuro:  Mental status: awake, alert, oriented to self, birthday, age, year, hospital. Stated June for month. Occasional nonsensical speech. Follows commands. Mild dysarthric. Able to name and repeat. No aphasia.   Cranial nerves: Pupils equally round and reactive to light, 3 mm,  visual fields full, no nystagmus, extraocular muscles intact, V1 through V3 intact bilaterally and symmetric, face symmetric, hearing intact to finger rub, palate elevation symmetric, tongue was midline, sternocleidomastoid/shoulder shrug strength bilaterally 5/5.    Motor:  Normal bulk and tone. No pronator drift.  strength strong bilaterally. All extremities 5/5 motor strength.    Sensation: Intact and symmetric to light touch.  No neglect.   Coordination: No dysmetria with finger to nose bilaterally.   Reflexes: 2+ in upper and lower extremities, absent Babinski bilaterally    MEDICATIONS  (STANDING):  atorvastatin 80milliGRAM(s) Oral at bedtime  pantoprazole    Tablet 40milliGRAM(s) Oral before breakfast  colchicine 0.6milliGRAM(s) Oral daily  allopurinol 300milliGRAM(s) Oral daily  folic acid 1milliGRAM(s) Oral daily  multivitamin 1Tablet(s) Oral daily  aspirin enteric coated 81milliGRAM(s) Oral daily  heparin  Injectable 5000Unit(s) SubCutaneous every 8 hours  QUEtiapine 12.5milliGRAM(s) Oral daily  latanoprost 0.005% Ophthalmic Solution 1Drop(s) Left EYE at bedtime  timolol 0.5% Solution 1Drop(s) Right EYE daily  mupirocin 2% Ointment 1Application(s) Topical three times a day  thiamine IVPB 250milliGRAM(s) IV Intermittent daily    MEDICATIONS  (PRN):  LORazepam   Injectable 2milliGRAM(s) IV Push every 6 hours PRN Agitation or withdrawal      Labs:    04-10    137  |  106  |  18  ----------------------------<  111<H>  4.6   |  22  |  0.88    Ca    8.7      10 Apr 2017 06:23  Phos  3.6     04-10  Mg     2.1     04-10    Thyroid Stimulating Hormone, Serum: 2.515 uIU/mL  Hemoglobin A1C, Whole Blood: 6.9 %  Cholesterol, Serum: 177 mg/dL  Triglycerides, Serum: 355 mg/dL  LDL 68   HDL Cholesterol, Serum: 38 mg/dL    Radiology:  CT head   IMPRESSION:   1. No evidence of acute intracranial hemorrhage and no apparent acute   abnormality.  2. Parenchymal volume loss and chronic microangiopathic disease, stable.    CTA head/neck     IMPRESSION:   No hemodynamically significant carotid or vertebral artery stenosis. Left   vertebral artery is dominant.  IMPRESSION: Intracranial CTA demonstrates no proximal vessel occlusion or   high-grade stenosis about the [place] of [place].    Assessment & Plan: Stroke Neurology Follow-Up Visit    [facility].   Pt seen and examined.   Currently without complaints. Wishes to return to sleep.  Denies headache, nausea, vomiting, weakness, or numbness.     Exam:  T(F): 98.9, Max: 98.9 (04-10 @ 23:23)  HR: 84 (82 - 102)  BP: 112/68 (103/71 - 149/84)  RR: 21 (15 - 25)  SpO2: 96% (93% - 97%)    Gen: No acute distress, left forehead (at eyebrow) swelling and sutured laceration-slightly ecchymotic   Neuro:  Mental status: awake, alert, oriented to self, birthday, age, year, hospital. Stated June for month. Occasional nonsensical speech. Follows commands. Mild dysarthric. Able to name and repeat. No aphasia.   Cranial nerves: Pupils equally round and reactive to light, 3 mm,  visual fields full, no nystagmus, extraocular muscles intact, V1 through V3 intact bilaterally and symmetric, face symmetric, hearing intact to finger rub, palate elevation symmetric, tongue was midline, sternocleidomastoid/shoulder shrug strength bilaterally 5/5.    Motor:  Normal bulk and tone. No pronator drift.  strength strong bilaterally. All extremities 5/5 motor strength.    Sensation: Intact and symmetric to light touch.  No neglect.   Coordination: No dysmetria with finger to nose bilaterally.   Reflexes: 2+ in upper and lower extremities, absent Babinski bilaterally    MEDICATIONS  (STANDING):  atorvastatin 80milliGRAM(s) Oral at bedtime  pantoprazole    Tablet 40milliGRAM(s) Oral before breakfast  colchicine 0.6milliGRAM(s) Oral daily  allopurinol 300milliGRAM(s) Oral daily  folic acid 1milliGRAM(s) Oral daily  multivitamin 1Tablet(s) Oral daily  aspirin enteric coated 81milliGRAM(s) Oral daily  heparin  Injectable 5000Unit(s) SubCutaneous every 8 hours  QUEtiapine 12.5milliGRAM(s) Oral daily  latanoprost 0.005% Ophthalmic Solution 1Drop(s) Left EYE at bedtime  timolol 0.5% Solution 1Drop(s) Right EYE daily  mupirocin 2% Ointment 1Application(s) Topical three times a day  thiamine IVPB 250milliGRAM(s) IV Intermittent daily    MEDICATIONS  (PRN):  LORazepam   Injectable 2milliGRAM(s) IV Push every 6 hours PRN Agitation or withdrawal      Labs:    04-10    137  |  106  |  18  ----------------------------<  111<H>  4.6   |  22  |  0.88    Ca    8.7      10 Apr 2017 06:23  Phos  3.6     04-10  Mg     2.1     04-10    Thyroid Stimulating Hormone, Serum: 2.515 uIU/mL  Hemoglobin A1C, Whole Blood: 6.9 %  Cholesterol, Serum: 177 mg/dL  Triglycerides, Serum: 355 mg/dL  LDL 68   HDL Cholesterol, Serum: 38 mg/dL    Radiology:  4/7 CT head: IMPRESSION:     No acute intracranial abnormality. Specifically, no acute intracranial   hemorrhage, mass effect or recent transcortical or territorial   infarction. Minimal small vessel ischemic disease.    4/7 CTA head/neck     IMPRESSION:   No hemodynamically significant carotid or vertebral artery stenosis. Left   vertebral artery is dominant.  IMPRESSION: Intracranial CTA demonstrates no proximal vessel occlusion or   high-grade stenosis about the [place] of [place].    4/8 CT head:   IMPRESSION:  No interval change.  No hydrocephalus, midline shift, acute intracranial hemorrhage or   demarcated territorial infarct.  Chronic white matter microangiopathic ischemic disease.    4/10 CT head   IMPRESSION:   1. No evidence of acute intracranial hemorrhage and no apparent acute   abnormality.  2. Parenchymal volume loss and chronic microangiopathic disease, stable.    4/10 Ct cervical spine:   IMPRESSION:   1. No CT evidence of acute osseous injury.  2. Multilevel degenerative osteoarthritis and disc disease.  3. Degenerative anterolisthesis of C5 with respect to C4.      Assessment & Plan:  61 year-old man with etoh abuse, hypertension, RA presented with slurred speech, confusion, right facial droop, word-finding difficulty s/p tPA on 4/7 for possible stroke.  Course complicated by delirium/agitation possibly related to alcohol withdrawal/ toxic metabolic encephalopathy.  Per wife, he has become progressively more confused in the last year.  Has developed cognitive issues as well.  Course complicated yesterday with a fall-attempted to get out of chair unassisted, sustained left eyebrow laceration s/p sutures.     -Serial CT head imaging with no evidence of stroke.  No residual focal deficits appreciated on exam.   -CTA head/neck without large vessel occlusion or stenosis   -Recommend MRI Brain w/o contrast to definitively rule out stroke and with sagittal cross-sections to fully evaluate mammary bodies.  -If MRI negative for stroke, needs work-up for cognitive decline.    -pending CT maxillofacial w/o contrast to assess for facial fractures post fall   -Continue aspirin 81 mg daily.   -Continue Lipitor 80mg po qd.    -hx of ETOH abuse - Continue thiamine, MVI, and folic acid   -agitation- improved today, continue seroquel Stroke Neurology Follow-Up Visit    [facility] [address] [place], [place] [address] events overnight.   Pt seen and examined.   Currently without complaints. Wishes to return to sleep.  Denies headache, nausea, vomiting, weakness, or numbness.     Exam:  T(F): 98.9, Max: 98.9 (04-10 @ 23:23)  HR: 84 (82 - 102)  BP: 112/68 (103/71 - 149/84)  RR: 21 (15 - 25)  SpO2: 96% (93% - 97%)    Gen: No acute distress, left forehead (at eyebrow) swelling and sutured laceration-slightly ecchymotic   Neuro:  Mental status: awake, alert, oriented to self, birthday, age, year, hospital. Stated June for month. Occasional nonsensical speech. Follows all commands.  Occasional difficulty with complex commands. Mild dysarthria. Able to name and repeat. No aphasia.  Recall 2/3.  Unable to perform serial 7's but able to do simple math.  Knows president, address, phone number.    Cranial nerves: Pupils equally round and reactive to light, 3 mm,  visual fields full, no nystagmus, difficulty with eom in all directions but particularly with upgaze bilaterally.  V1 through V3 intact bilaterally and symmetric, face symmetric, hearing intact to finger rub, palate elevation symmetric, tongue was midline, sternocleidomastoid/shoulder shrug strength bilaterally 5/5.    Motor:  Normal bulk and tone. No pronator drift.  strength strong bilaterally. All extremities 5/5 motor strength.    Sensation: Intact and symmetric to light touch.  No neglect.   Coordination: No dysmetria with finger to nose bilaterally.   Reflexes: 2+ in upper and lower extremities, absent Babinski bilaterally    MEDICATIONS  (STANDING):  atorvastatin 80milliGRAM(s) Oral at bedtime  pantoprazole    Tablet 40milliGRAM(s) Oral before breakfast  colchicine 0.6milliGRAM(s) Oral daily  allopurinol 300milliGRAM(s) Oral daily  folic acid 1milliGRAM(s) Oral daily  multivitamin 1Tablet(s) Oral daily  aspirin enteric coated 81milliGRAM(s) Oral daily  heparin  Injectable 5000Unit(s) SubCutaneous every 8 hours  QUEtiapine 12.5milliGRAM(s) Oral daily  latanoprost 0.005% Ophthalmic Solution 1Drop(s) Left EYE at bedtime  timolol 0.5% Solution 1Drop(s) Right EYE daily  mupirocin 2% Ointment 1Application(s) Topical three times a day  thiamine IVPB 250milliGRAM(s) IV Intermittent daily    MEDICATIONS  (PRN):  LORazepam   Injectable 2milliGRAM(s) IV Push every 6 hours PRN Agitation or withdrawal      Labs:    04-10    137  |  106  |  18  ----------------------------<  111<H>  4.6   |  22  |  0.88    Ca    8.7      10 Apr 2017 06:23  Phos  3.6     04-10  Mg     2.1     04-10    Thyroid Stimulating Hormone, Serum: 2.515 uIU/mL  Hemoglobin A1C, Whole Blood: 6.9 %  Cholesterol, Serum: 177 mg/dL  Triglycerides, Serum: 355 mg/dL  LDL 68   HDL Cholesterol, Serum: 38 mg/dL    Radiology:  4/7 CT head: IMPRESSION:     No acute intracranial abnormality. Specifically, no acute intracranial   hemorrhage, mass effect or recent transcortical or territorial   infarction. Minimal small vessel ischemic disease.    4/7 CTA head/neck     IMPRESSION:   No hemodynamically significant carotid or vertebral artery stenosis. Left   vertebral artery is dominant.  IMPRESSION: Intracranial CTA demonstrates no proximal vessel occlusion or   high-grade stenosis about the [place] of [place].    4/8 CT head:   IMPRESSION:  No interval change.  No hydrocephalus, midline shift, acute intracranial hemorrhage or   demarcated territorial infarct.  Chronic white matter microangiopathic ischemic disease.    4/10 CT head   IMPRESSION:   1. No evidence of acute intracranial hemorrhage and no apparent acute   abnormality.  2. Parenchymal volume loss and chronic microangiopathic disease, stable.    4/10 Ct cervical spine:   IMPRESSION:   1. No CT evidence of acute osseous injury.  2. Multilevel degenerative osteoarthritis and disc disease.  3. Degenerative anterolisthesis of C5 with respect to C4.      Assessment & Plan:  61 year-old man with etoh abuse, hypertension, RA presented with slurred speech, confusion, right facial droop, word-finding difficulty s/p tPA on 4/7 for possible stroke.  Course complicated by delirium/agitation possibly related to alcohol withdrawal/ toxic metabolic encephalopathy.  Per wife, he has become progressively more confused in the last year.  Has developed cognitive issues as well.  Course complicated yesterday with a fall-attempted to get out of chair unassisted, sustained left eyebrow laceration s/p sutures.     -Serial CT head imaging with no evidence of stroke.  No residual focal deficits appreciated on exam.   -CTA head/neck without large vessel occlusion or stenosis   -Recommend MRI Brain w/o contrast to definitively rule out stroke and with sagittal cross-sections to fully evaluate mammary bodies.  -If MRI negative for stroke, needs work-up for one year of cognitive decline.    -Pt seems to have ophthalmoplegia on exam particularly with upgaze as well as confusion and dysmetria.  Wernicke's encephalopathy possible.  Continue thiamine.    -pending CT maxillofacial w/o contrast to assess for facial fractures post fall   -Continue aspirin 81 mg daily.   -Continue Lipitor 80mg po qd.    -hx of ETOH abuse - Continue thiamine, MVI, and folic acid   -agitation- resolved, hold seroquel at this time

## 2017-04-11 NOTE — OCCUPATIONAL THERAPY INITIAL EVALUATION ADULT - ADDITIONAL COMMENTS
Prior level of function unclear. Per wife patient was still working PTA, but having difficulty fulfilling duties. Wife prepares meals, states patient with difficulty during ambulation 2/2 impaired vision.

## 2017-04-11 NOTE — PHYSICAL THERAPY INITIAL EVALUATION ADULT - ADDITIONAL COMMENTS
Wife present through evaluation states there are two entrances with 2-3 steps to their building. 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 Wife states "he only wants to be at the bar."

## 2017-04-11 NOTE — PHYSICAL THERAPY INITIAL EVALUATION ADULT - GENERAL OBSERVATIONS, REHAB EVAL
Patient received left side lying (+) EKG (+) PIV, (+) laceration left eye brown stitches intact,  confused, agitated, complaining of elbow pain, eyes closed trying to get out of bed to utilize bathroom

## 2017-04-11 NOTE — PHYSICAL THERAPY INITIAL EVALUATION ADULT - LEVEL OF INDEPENDENCE: SIT/STAND, REHAB EVAL
moderate assist (50% patients effort)/maximum assist (25% patients effort)/(+) left lateral lean while seated at the edge of the bed required verbal cues to obtain midline

## 2017-04-11 NOTE — PROGRESS NOTE ADULT - SUBJECTIVE AND OBJECTIVE BOX
PRY-1 TRANSFER NOTE  Hospital Course  Pt is a 62yo M with PMH of HTN, glaucoma, gout, alcohol abuse and RA, presenting with slurred speech and confusion, found to have R facial droop and word-finding difficulty on exam (NIH scale 4). STAT CT head done, found to have no intracranial hemorrorhage; tPA given at 18:41PM on 4/7; admitted for post-tpa monitoring in MICU. Repeat CT head @24hr post-tPA shows no interval change, no hydrocephalus, midline shift, acute intracranial hemorrhage or demarcated territorial infarct. Started on ASA and HSQ. Collateral information from wife regarding alcohol abuse and pt's delirious behavior, prompted for initiation of IV thiamine therapy and further evaluation for MRI brain for evaluation of mamillary bodies. Pt with agitation/delirium over the weekend - started on Seroquel and Ativan prn. Vit B12, folate and TSH wnl; awaiting syphilis screen. S/p fall this AM related to agitation/sedation- pt was sitting in chair and then found lying on ground, likely leaned forward, falling and hitting his head. + L sided above eye 2cm in length and 0.5-1cm deep laceration, repaired by plastic surgery. CT head/neck/maxillofacial bones/cervical spine imaging negative. Plastic surgery consulted for suturing of forehead laceration. Pt medically optimized for step-down to [facility] for further monitoring. Patient stable on [facility]. Will be stepped down to [facility] for further management.     OBJECTIVE:    VITAL SIGNS:  ICU Vital Signs Last 24 Hrs  T(C): 36.9, Max: 37.2 (04-10 @ 23:23)  T(F): 98.5, Max: 98.9 (04-10 @ 23:23)  HR: 82 (82 - 102)  BP: 140/75 (103/71 - 146/84)  BP(mean): 98 (86 - 122)  ABP: --  ABP(mean): --  RR: 20 (18 - 25)  SpO2: 98% (94% - 98%)        I & Os for current day (as of 04-11 @ 13:30)  =============================================  IN: 950 ml / OUT: 450 ml / NET: 500 ml    CAPILLARY BLOOD GLUCOSE      PHYSICAL EXAM:    PE: General: NAD, comfortable lying in bed, A&Ox2  HEENT: PERRL, b/ ophthalmoplegia prominent with upward gaze, clear conjunctiva, MMM  Respiratory: CTA B/L, no W/R/R, no retractions  Cardiac: normal +S1/S2, RRR, no M/R/G  GI: soft, NTND, bowel sounds normoactive  Ext: No peripheral edema  Neurologic: A&Ox2 (knows self, month, said year was 1917, knew we were in a hospital but said incorrect one). Pt moving all extremities, hand  strength 5/5 bilaterally, foot dorsiflexion 5/5 bilaterally, can lift both legs off the bed. Face symmetric. Mild dysarthria. Sensation to light touch intact throughout. Patient answers most questions appropriately, but asked if we were going "back to the basement where the beer is"      MEDICATIONS:  MEDICATIONS  (STANDING):  atorvastatin 80milliGRAM(s) Oral at bedtime  pantoprazole    Tablet 40milliGRAM(s) Oral before breakfast  colchicine 0.6milliGRAM(s) Oral daily  allopurinol 300milliGRAM(s) Oral daily  folic acid 1milliGRAM(s) Oral daily  multivitamin 1Tablet(s) Oral daily  aspirin enteric coated 81milliGRAM(s) Oral daily  heparin  Injectable 5000Unit(s) SubCutaneous every 8 hours  QUEtiapine 12.5milliGRAM(s) Oral daily  latanoprost 0.005% Ophthalmic Solution 1Drop(s) Left EYE at bedtime  timolol 0.5% Solution 1Drop(s) Right EYE daily  mupirocin 2% Ointment 1Application(s) Topical three times a day  thiamine IVPB 250milliGRAM(s) IV Intermittent daily    MEDICATIONS  (PRN):  LORazepam   Injectable 2milliGRAM(s) IV Push every 6 hours PRN Agitation or withdrawal      ALLERGIES:  Allergies    No Known Allergies    Intolerances        LABS:    04-10    137  |  106  |  18  ----------------------------<  111<H>  4.6   |  22  |  0.88    Ca    8.7      10 Apr 2017 06:23  Phos  3.6     04-10  Mg     2.1     04-10            RADIOLOGY & ADDITIONAL TESTS: Reviewed.

## 2017-04-11 NOTE — PHYSICAL THERAPY INITIAL EVALUATION ADULT - GAIT DEVIATIONS NOTED, PT EVAL
decreased stride length/decreased swing-to-stance ratio/decreased weight-shifting ability/decreased step length/decreased velocity of limb motion/(+) tremulousness poor postural awareness, eyes remained closed for portion of gait training,(+) left lateral lean/decreased mary/increased time in double stance

## 2017-04-11 NOTE — PHYSICAL THERAPY INITIAL EVALUATION ADULT - CRITERIA FOR SKILLED THERAPEUTIC INTERVENTIONS
rehab potential/impairments found/functional limitations in following categories/risk reduction/prevention/therapy frequency/anticipated discharge recommendation

## 2017-04-11 NOTE — PROGRESS NOTE ADULT - PROBLEM SELECTOR PLAN 2
Concern given hx of alcohol abuse (provided by wife), pt was delirious, with visual and auditory hallucinations for >24 hours.  - Awaiting MRI with sagittal cross-sections to fully evaluate mammary bodies.  - C/w Ativan 2mg IV q6 PRN for agitation/withdrawal symptoms.  - C/w Seroquel 12.5mg po qd. Dose decreased given sedation yesterday AM.  - C/w folic acid, multivitamin, and thiamine.  - Monitor CIWA  - S/p fall related to agitation/sedation yesterday AM. Pt was sitting in chair and then found lying on ground, likely leaned forward, falling and hitting his head. + L sided above eye 2cm in length and 0.5-1cm deep laceration. Awaiting CT head/neck, maxillofacial bones, and cervical spine. Plastic surgery consulted for suturing of forehead laceration. Concern given hx of alcohol abuse (provided by wife), pt was delirious, with visual and auditory hallucinations for >24 hours.  - Awaiting MRI with sagittal cross-sections to fully evaluate mammary bodies.  - C/w Ativan 2mg IV q6 PRN for agitation/withdrawal symptoms.  - C/w Seroquel 12.5mg po qd. Dose decreased given sedation yesterday AM.  - C/w folic acid, multivitamin, and thiamine.  - Monitor CIWA  - S/p fall related to agitation/sedation yesterday AM. Pt was sitting in chair and then found lying on ground, likely leaned forward, falling and hitting his head. + L sided above eye 2cm in length and 0.5-1cm deep laceration. CT head/neck negative. Plastic surgery consulted, sutured forehead laceration. Concern given hx of alcohol abuse (provided by wife), pt was delirious, with visual and auditory hallucinations for >24 hours.  - Awaiting MRI with sagittal cross-sections to fully evaluate mammary bodies.  - C/w Ativan 2mg IV q6 PRN for agitation/withdrawal symptoms.  - C/w Seroquel 12.5mg po qd. Dose decreased given sedation yesterday AM.  - C/w folic acid, multivitamin. On Wernicky encephalopathy treatment dose of Thiamin.  - Monitor CIWA  - S/p fall related to agitation/sedation yesterday AM. Pt was sitting in chair and then found lying on ground, likely leaned forward, falling and hitting his head. + L sided above eye 2cm in length and 0.5-1cm deep laceration. CT head/neck negative. Plastic surgery consulted, sutured forehead laceration.

## 2017-04-11 NOTE — OCCUPATIONAL THERAPY INITIAL EVALUATION ADULT - PLANNED THERAPY INTERVENTIONS, OT EVAL
strengthening/cognitive, visual perceptual/transfer training/ADL retraining/fine motor coordination training/IADL retraining/bed mobility training/balance training

## 2017-04-12 LAB
ANION GAP SERPL CALC-SCNC: 13 MMOL/L — SIGNIFICANT CHANGE UP (ref 9–16)
BUN SERPL-MCNC: 23 MG/DL — SIGNIFICANT CHANGE UP (ref 7–23)
CALCIUM SERPL-MCNC: 9 MG/DL — SIGNIFICANT CHANGE UP (ref 8.5–10.5)
CHLORIDE SERPL-SCNC: 104 MMOL/L — SIGNIFICANT CHANGE UP (ref 96–108)
CO2 SERPL-SCNC: 22 MMOL/L — SIGNIFICANT CHANGE UP (ref 22–31)
CREAT SERPL-MCNC: 1.01 MG/DL — SIGNIFICANT CHANGE UP (ref 0.5–1.3)
GLUCOSE SERPL-MCNC: 105 MG/DL — HIGH (ref 70–99)
HCT VFR BLD CALC: 38.9 % — LOW (ref 39–50)
HGB BLD-MCNC: 13.5 G/DL — SIGNIFICANT CHANGE UP (ref 13–17)
MAGNESIUM SERPL-MCNC: 2.1 MG/DL — SIGNIFICANT CHANGE UP (ref 1.6–2.4)
MCHC RBC-ENTMCNC: 31.4 PG — SIGNIFICANT CHANGE UP (ref 27–34)
MCHC RBC-ENTMCNC: 34.7 G/DL — SIGNIFICANT CHANGE UP (ref 32–36)
MCV RBC AUTO: 90.5 FL — SIGNIFICANT CHANGE UP (ref 80–100)
PLATELET # BLD AUTO: 171 K/UL — SIGNIFICANT CHANGE UP (ref 150–400)
POTASSIUM SERPL-MCNC: 3.6 MMOL/L — SIGNIFICANT CHANGE UP (ref 3.5–5.3)
POTASSIUM SERPL-SCNC: 3.6 MMOL/L — SIGNIFICANT CHANGE UP (ref 3.5–5.3)
RBC # BLD: 4.3 M/UL — SIGNIFICANT CHANGE UP (ref 4.2–5.8)
RBC # FLD: 13.5 % — SIGNIFICANT CHANGE UP (ref 10.3–16.9)
SODIUM SERPL-SCNC: 139 MMOL/L — SIGNIFICANT CHANGE UP (ref 135–145)
WBC # BLD: 8.7 K/UL — SIGNIFICANT CHANGE UP (ref 3.8–10.5)
WBC # FLD AUTO: 8.7 K/UL — SIGNIFICANT CHANGE UP (ref 3.8–10.5)

## 2017-04-12 PROCEDURE — 93010 ELECTROCARDIOGRAM REPORT: CPT

## 2017-04-12 PROCEDURE — 99232 SBSQ HOSP IP/OBS MODERATE 35: CPT

## 2017-04-12 RX ORDER — FOLIC ACID 0.8 MG
1 TABLET ORAL
Qty: 0 | Refills: 0 | COMMUNITY
Start: 2017-04-12

## 2017-04-12 RX ORDER — HALOPERIDOL DECANOATE 100 MG/ML
1 INJECTION INTRAMUSCULAR ONCE
Qty: 0 | Refills: 0 | Status: COMPLETED | OUTPATIENT
Start: 2017-04-12 | End: 2017-04-12

## 2017-04-12 RX ORDER — ATORVASTATIN CALCIUM 80 MG/1
1 TABLET, FILM COATED ORAL
Qty: 0 | Refills: 0 | COMMUNITY
Start: 2017-04-12

## 2017-04-12 RX ORDER — COLCHICINE 0.6 MG
1 TABLET ORAL
Qty: 0 | Refills: 0 | COMMUNITY
Start: 2017-04-12

## 2017-04-12 RX ORDER — PANTOPRAZOLE SODIUM 20 MG/1
1 TABLET, DELAYED RELEASE ORAL
Qty: 0 | Refills: 0 | COMMUNITY
Start: 2017-04-12

## 2017-04-12 RX ORDER — COLCHICINE 0.6 MG
0 TABLET ORAL
Qty: 0 | Refills: 0 | COMMUNITY

## 2017-04-12 RX ORDER — HALOPERIDOL DECANOATE 100 MG/ML
1 INJECTION INTRAMUSCULAR ONCE
Qty: 0 | Refills: 0 | Status: DISCONTINUED | OUTPATIENT
Start: 2017-04-12 | End: 2017-04-12

## 2017-04-12 RX ORDER — TIMOLOL 0.5 %
1 DROPS OPHTHALMIC (EYE)
Qty: 0 | Refills: 0 | COMMUNITY
Start: 2017-04-12

## 2017-04-12 RX ORDER — LATANOPROST 0.05 MG/ML
1 SOLUTION/ DROPS OPHTHALMIC; TOPICAL
Qty: 0 | Refills: 0 | COMMUNITY
Start: 2017-04-12

## 2017-04-12 RX ORDER — MUPIROCIN 20 MG/G
1 OINTMENT TOPICAL THREE TIMES A DAY
Qty: 0 | Refills: 0 | Status: DISCONTINUED | OUTPATIENT
Start: 2017-04-12 | End: 2017-04-13

## 2017-04-12 RX ORDER — AMLODIPINE BESYLATE 2.5 MG/1
10 TABLET ORAL
Qty: 0 | Refills: 0 | COMMUNITY

## 2017-04-12 RX ORDER — ALLOPURINOL 300 MG
1 TABLET ORAL
Qty: 0 | Refills: 0 | COMMUNITY
Start: 2017-04-12

## 2017-04-12 RX ORDER — ALLOPURINOL 300 MG
0 TABLET ORAL
Qty: 0 | Refills: 0 | COMMUNITY

## 2017-04-12 RX ORDER — ASPIRIN/CALCIUM CARB/MAGNESIUM 324 MG
1 TABLET ORAL
Qty: 0 | Refills: 0 | COMMUNITY
Start: 2017-04-12

## 2017-04-12 RX ORDER — MUPIROCIN 20 MG/G
1 OINTMENT TOPICAL
Qty: 0 | Refills: 0 | COMMUNITY
Start: 2017-04-12

## 2017-04-12 RX ADMIN — HEPARIN SODIUM 5000 UNIT(S): 5000 INJECTION INTRAVENOUS; SUBCUTANEOUS at 15:28

## 2017-04-12 RX ADMIN — MUPIROCIN 1 APPLICATION(S): 20 OINTMENT TOPICAL at 06:23

## 2017-04-12 RX ADMIN — HEPARIN SODIUM 5000 UNIT(S): 5000 INJECTION INTRAVENOUS; SUBCUTANEOUS at 06:22

## 2017-04-12 RX ADMIN — QUETIAPINE FUMARATE 12.5 MILLIGRAM(S): 200 TABLET, FILM COATED ORAL at 00:19

## 2017-04-12 RX ADMIN — Medication 2 MILLIGRAM(S): at 12:25

## 2017-04-12 RX ADMIN — HALOPERIDOL DECANOATE 1 MILLIGRAM(S): 100 INJECTION INTRAMUSCULAR at 11:29

## 2017-04-12 RX ADMIN — PANTOPRAZOLE SODIUM 40 MILLIGRAM(S): 20 TABLET, DELAYED RELEASE ORAL at 06:22

## 2017-04-12 NOTE — DISCHARGE NOTE ADULT - PATIENT PORTAL LINK FT
“You can access the Follow[facility] Patient Portal, offered by [facility], by registering with the following website: [email or web address]”

## 2017-04-12 NOTE — PROGRESS NOTE ADULT - ASSESSMENT
Pt is a 62yo M with HTN, glaucoma, gout, alcohol abuse and RA who presented with slurred speech, confusion, R facial droop and word-finding difficulty (NIH scale 4), now s/p tPA on 4/7, course complicated by delirium possibly 2/2 alcohol withdrawal/toxic metabolic encephalopathy, currently improved, stepped down to [facility] and now stepped down to [facility]. Pt is a 60yo M with HTN, glaucoma, gout, alcohol abuse and RA who presented with slurred speech, confusion, R facial droop and word-finding difficulty (NIH scale 4), now s/p tPA on 4/7, course complicated by delirium possibly 2/2 alcohol withdrawal/toxic metabolic encephalopathy, currently improved, stepped down to [facility] and now stepped down to [facility].  Not a stroke.

## 2017-04-12 NOTE — DISCHARGE NOTE ADULT - NS AS ACTIVITY OBS
Do not make important decisions/Do not drive or operate machinery/Bathing allowed/Showering allowed/No Heavy lifting/straining

## 2017-04-12 NOTE — DISCHARGE NOTE ADULT - CARE PROVIDER_API CALL
Javy Joel), Neurology; Vascular Neurology  [address] [address]  Phone: [phone]  Fax: [phone] Neurology,   [facility]  [phone]  [address], [address] [place] [address], [place] [address]  Phone: (   )    -  Fax: (   )    -

## 2017-04-12 NOTE — DISCHARGE NOTE ADULT - NS AS DC STROKE ED MATERIALS
Need for Followup After Discharge/Prescribed Medications/Stroke Education Booklet/Risk Factors for Stroke/Stroke Warning Signs and Symptoms/Call 911 for Stroke

## 2017-04-12 NOTE — DISCHARGE NOTE ADULT - PLAN OF CARE
eliminate future events please take your medications as prescribed, please followup outpatient with Dr. Joel. control future events please followup outpatient with Neurology and with Psychiatry

## 2017-04-12 NOTE — PROGRESS NOTE ADULT - PROBLEM SELECTOR PROBLEM 6
Glaucoma, unspecified glaucoma, unspecified laterality Nutrition, metabolism, and development symptoms

## 2017-04-12 NOTE — PROGRESS NOTE ADULT - SUBJECTIVE AND OBJECTIVE BOX
Stroke Neurology Follow-Up Visit    Transferred to [facility] yesterday. Went for MRI last night, but was unable to tolerate complete study.  Today, pt intermittently agitated, trying to get out of bed. Was given haldol 1 mg, improvement in agitation.   Pt seen and examined. Reports fatigue at this time, but easily arousable and following simple commands.  Expresses wishes to go home.   No further acute complaints at this time.     Exam:  T(F): 97.1, Max: 98.6 (04-12 @ 04:31)  HR: 87 (85 - 96)  BP: 118/67 (118/67 - 153/89)  RR: 17 (17 - 20)  SpO2: 93% (89% - 99%)    Gen: No acute distress, calm at this time, but reluctant to fully cooperate with exam   Head: left forehead (at eyebrow) swelling and sutured laceration-slightly ecchymotic   Neuro:  Mental status: awake, alert, oriented to self, birthday, age, month, and year. Stated he was at niece's home right now. Occasional nonsensical speech. Follows all simple commands. Difficulty with complex commands. Mild dysarthria. Able to name and repeat. No aphasia.     Cranial nerves: Pupils equally round and reactive to light, 3 mm,  was not cooperative with visual field testing, no nystagmus, difficulty with eom in all directions but particularly with upgaze bilaterally.  V1 through V3 intact bilaterally and symmetric, face symmetric, hearing intact to finger rub, palate elevation symmetric, tongue was midline, sternocleidomastoid/shoulder shrug strength bilaterally 5/5.    Motor:  Normal bulk and tone. No pronator drift.  strength strong bilaterally. All extremities 5/5 motor strength.    Sensation: Intact and symmetric to light touch.  Withdraws briskly of all extremities to painful stimuli. No neglect.   Coordination: Refusing to perform at this time   Reflexes: 2+ in upper and lower extremities, absent Babinski bilaterally    MEDICATIONS  (STANDING):  atorvastatin 80milliGRAM(s) Oral at bedtime  pantoprazole    Tablet 40milliGRAM(s) Oral before breakfast  colchicine 0.6milliGRAM(s) Oral daily  allopurinol 300milliGRAM(s) Oral daily  folic acid 1milliGRAM(s) Oral daily  multivitamin 1Tablet(s) Oral daily  aspirin enteric coated 81milliGRAM(s) Oral daily  heparin  Injectable 5000Unit(s) SubCutaneous every 8 hours  latanoprost 0.005% Ophthalmic Solution 1Drop(s) Left EYE at bedtime  timolol 0.5% Solution 1Drop(s) Right EYE daily  thiamine IVPB 250milliGRAM(s) IV Intermittent daily  insulin lispro (HumaLOG) corrective regimen sliding scale  SubCutaneous Before meals and at bedtime  mupirocin 2% Cream 1Application(s) Topical three times a day    MEDICATIONS  (PRN):  LORazepam   Injectable 2milliGRAM(s) IV Push every 6 hours PRN Agitation or withdrawal  QUEtiapine 12.5milliGRAM(s) Oral at bedtime PRN Agitation      Labs:                        13.5   8.7   )-----------( 171      ( 12 Apr 2017 06:30 )             38.9     04-12    139  |  104  |  23  ----------------------------<  105<H>  3.6   |  22  |  1.01    Ca    9.0      12 Apr 2017 06:30  Mg     2.1     04-12    Thyroid Stimulating Hormone, Serum: 2.515 uIU/mL  Hemoglobin A1C, Whole Blood: 6.9 %  Cholesterol, Serum: 177 mg/dL  LDL 68   Triglycerides, Serum: 355 mg/dL  HDL Cholesterol, Serum: 38 mg/dL    Radiology:  4/7 CT head: IMPRESSION:     No acute intracranial abnormality. Specifically, no acute intracranial   hemorrhage, mass effect or recent transcortical or territorial   infarction. Minimal small vessel ischemic disease.    4/7 CTA head/neck     IMPRESSION:   No hemodynamically significant carotid or vertebral artery stenosis. Left   vertebral artery is dominant.  IMPRESSION: Intracranial CTA demonstrates no proximal vessel occlusion or   high-grade stenosis about the [place].    4/8 CT head:   IMPRESSION:  No interval change.  No hydrocephalus, midline shift, acute intracranial hemorrhage or   demarcated territorial infarct.  Chronic white matter microangiopathic ischemic disease.    4/10 CT head   IMPRESSION:   1. No evidence of acute intracranial hemorrhage and no apparent acute   abnormality.  2. Parenchymal volume loss and chronic microangiopathic disease, stable.    4/10 Ct cervical spine:   IMPRESSION:   1. No CT evidence of acute osseous injury.  2. Multilevel degenerative osteoarthritis and disc disease.  3. Degenerative anterolisthesis of C5 with respect to C4.    4/11 CT Maxillofacial:   IMPRESSION:-  1.  No fracture in the facial bones.  2.  Focal soft-tissue swelling in the left lateral periorbital and left malar regions.  3.  Xiomy mandibulares are noted.     4/11 MRI head w/o:   IMPRESSION:-  1.  An incomplete study.  2.  No intracranial hemorrhage, mass or acute/subacute infarct.    Assessment & Plan:  61 year-old man with etoh abuse, hypertension, RA presented with slurred speech, confusion, right facial droop, word-finding difficulty s/p tPA on 4/7 for possible stroke.  Course complicated by delirium/agitation possibly related to alcohol withdrawal/ toxic metabolic encephalopathy.  Per wife, he has become progressively more confused in the last year.  Has developed cognitive issues as well.  Course complicated on 4/10 with a fall-attempted to get out of chair unassisted, sustained left eyebrow laceration s/p sutures.     -MRI head: limited study was able to obtain DWI and FLAIR sequences, no acute ischemic stroke noted   -CTA head/neck without large vessel occlusion or stenosis   -would likely benefit from outpatient cognitive testing given history of one year cognitive decline.    -Pt seems to have ophthalmoplegia on exam particularly with upgaze as well as confusion and dysmetria.  Wernicke's encephalopathy possible.  Continue thiamine.    -will check EEG today to r/o seizures as a source of his confusion   -Continue aspirin 81 mg daily for primary stroke prevention   -Continue Lipitor 80mg po qd.    -hx of ETOH abuse - Continue thiamine, MVI, and folic acid   -agitation- psych consulted, was on seroquel 12.5 mg daily but    -if EEG negative, then likely for discharge tomorrow to [facility] Stroke Neurology Follow-Up Visit    Transferred to [facility] yesterday. Went for MRI last night, but was unable to tolerate complete study.  Today, pt intermittently agitated, trying to get out of bed. Was given haldol 1 mg, improvement in agitation.   Pt seen and examined. Reports fatigue at this time, but easily arousable and following simple commands.  Expresses wishes to go home.   No further acute complaints at this time.     Exam:  T(F): 97.1, Max: 98.6 (04-12 @ 04:31)  HR: 87 (85 - 96)  BP: 118/67 (118/67 - 153/89)  RR: 17 (17 - 20)  SpO2: 93% (89% - 99%)    Gen: No acute distress, calm at this time, but reluctant to fully cooperate with exam   Head: left forehead (at eyebrow) swelling and sutured laceration-slightly ecchymotic   Neuro:  Mental status: awake, alert, oriented to self, birthday, age, month, and year. Stated he was at niece's home right now. Occasional nonsensical speech. Follows all simple commands. Difficulty with complex commands. Mild dysarthria. Able to name and repeat. No aphasia.     Cranial nerves: Pupils equally round and reactive to light, 3 mm,  was not cooperative with visual field testing, no nystagmus, difficulty with eom in all directions but particularly with upgaze bilaterally.  V1 through V3 intact bilaterally and symmetric, face symmetric, hearing intact to finger rub, palate elevation symmetric, tongue was midline, sternocleidomastoid/shoulder shrug strength bilaterally 5/5.    Motor:  Normal bulk and tone. No pronator drift.  strength strong bilaterally. All extremities 5/5 motor strength.    Sensation: Intact and symmetric to light touch.  Withdraws briskly of all extremities to painful stimuli. No neglect.   Coordination: Refusing to perform at this time   Reflexes: 2+ in upper and lower extremities, absent Babinski bilaterally    MEDICATIONS  (STANDING):  atorvastatin 80milliGRAM(s) Oral at bedtime  pantoprazole    Tablet 40milliGRAM(s) Oral before breakfast  colchicine 0.6milliGRAM(s) Oral daily  allopurinol 300milliGRAM(s) Oral daily  folic acid 1milliGRAM(s) Oral daily  multivitamin 1Tablet(s) Oral daily  aspirin enteric coated 81milliGRAM(s) Oral daily  heparin  Injectable 5000Unit(s) SubCutaneous every 8 hours  latanoprost 0.005% Ophthalmic Solution 1Drop(s) Left EYE at bedtime  timolol 0.5% Solution 1Drop(s) Right EYE daily  thiamine IVPB 250milliGRAM(s) IV Intermittent daily  insulin lispro (HumaLOG) corrective regimen sliding scale  SubCutaneous Before meals and at bedtime  mupirocin 2% Cream 1Application(s) Topical three times a day    MEDICATIONS  (PRN):  LORazepam   Injectable 2milliGRAM(s) IV Push every 6 hours PRN Agitation or withdrawal  QUEtiapine 12.5milliGRAM(s) Oral at bedtime PRN Agitation      Labs:                        13.5   8.7   )-----------( 171      ( 12 Apr 2017 06:30 )             38.9     04-12    139  |  104  |  23  ----------------------------<  105<H>  3.6   |  22  |  1.01    Ca    9.0      12 Apr 2017 06:30  Mg     2.1     04-12    Thyroid Stimulating Hormone, Serum: 2.515 uIU/mL  Hemoglobin A1C, Whole Blood: 6.9 %  Cholesterol, Serum: 177 mg/dL  LDL 68   Triglycerides, Serum: 355 mg/dL  HDL Cholesterol, Serum: 38 mg/dL    Radiology:  4/7 CT head: IMPRESSION:     No acute intracranial abnormality. Specifically, no acute intracranial   hemorrhage, mass effect or recent transcortical or territorial   infarction. Minimal small vessel ischemic disease.    4/7 CTA head/neck     IMPRESSION:   No hemodynamically significant carotid or vertebral artery stenosis. Left   vertebral artery is dominant.  IMPRESSION: Intracranial CTA demonstrates no proximal vessel occlusion or   high-grade stenosis about the [place].    4/8 CT head:   IMPRESSION:  No interval change.  No hydrocephalus, midline shift, acute intracranial hemorrhage or   demarcated territorial infarct.  Chronic white matter microangiopathic ischemic disease.    4/10 CT head   IMPRESSION:   1. No evidence of acute intracranial hemorrhage and no apparent acute   abnormality.  2. Parenchymal volume loss and chronic microangiopathic disease, stable.    4/10 Ct cervical spine:   IMPRESSION:   1. No CT evidence of acute osseous injury.  2. Multilevel degenerative osteoarthritis and disc disease.  3. Degenerative anterolisthesis of C5 with respect to C4.    4/11 CT Maxillofacial:   IMPRESSION:-  1.  No fracture in the facial bones.  2.  Focal soft-tissue swelling in the left lateral periorbital and left malar regions.  3.  Xiomy mandibulares are noted.     4/11 MRI head w/o:   IMPRESSION:-  1.  An incomplete study.  2.  No intracranial hemorrhage, mass or acute/subacute infarct.    Assessment & Plan:  61 year-old man with etoh abuse, hypertension, RA presented with slurred speech, confusion, right facial droop, word-finding difficulty s/p tPA on 4/7 for possible stroke.  Course complicated by delirium/agitation possibly related to alcohol withdrawal/ toxic metabolic encephalopathy.  Per wife, he has become progressively more confused in the last year.  Has developed cognitive issues as well.  Course complicated on 4/10 with a fall-attempted to get out of chair unassisted, sustained left eyebrow laceration s/p sutures.     -MRI head: NOT consistent with acute stroke.    -CTA head/neck without large vessel occlusion or stenosis   -pt more alert and conversive today  -Pt seems to have ophthalmoplegia on exam particularly with upgaze as well as confusion and dysmetria.  Wernicke's encephalopathy possible.  Continue thiamine.  Needs outpatient work-up for progressive cognitive decline and confusion per wife.    -will check EEG today to r/o seizures as a source of his confusion   -Continue aspirin 81 mg daily for primary stroke prevention   -Continue Lipitor 80mg po qd.    -hx of ETOH abuse - Continue thiamine, MVI, and folic acid   -agitation- psych consulted, on seroquel.  monitor qtc  -if EEG negative, then likely for discharge tomorrow to [facility]

## 2017-04-12 NOTE — DISCHARGE NOTE ADULT - MEDICATION SUMMARY - MEDICATIONS TO TAKE
I will START or STAY ON the medications listed below when I get home from the hospital:    aspirin 81 mg oral delayed release tablet  -- 1 tab(s) by mouth once a day  -- Indication: For stroke    colchicine 0.6 mg oral tablet  -- 1 tab(s) by mouth once a day  -- Indication: For Gout    allopurinol 300 mg oral tablet  -- 1 tab(s) by mouth once a day  -- Indication: For Gout    atorvastatin 80 mg oral tablet  -- 1 tab(s) by mouth once a day (at bedtime)  -- Indication: For stroke    SEROquel 25 mg oral tablet  -- 0.5 tab(s) by mouth once a day (at bedtime), As Needed  -- Indication: For Agitation    mupirocin 2% topical cream  -- 1 application on skin 3 times a day  -- Indication: For laceration above L eye    latanoprost 0.005% ophthalmic solution  -- 1 drop(s) to each affected eye once a day (at bedtime)  -- Indication: For Glaucoma, unspecified glaucoma, unspecified laterality    timolol maleate 0.5% ophthalmic solution  -- 1 drop(s) to each affected eye once a day  -- Indication: For Glaucoma, unspecified glaucoma, unspecified laterality    pantoprazole 40 mg oral delayed release tablet  -- 1 tab(s) by mouth once a day (before a meal)  -- Indication: For Gerd    Multiple Vitamins oral tablet  -- 1 tab(s) by mouth once a day  -- Indication: For Health maintenance    folic acid 1 mg oral tablet  -- 1 tab(s) by mouth once a day  -- Indication: For Health maintenance I will START or STAY ON the medications listed below when I get home from the hospital:    aspirin 81 mg oral delayed release tablet  -- 1 tab(s) by mouth once a day  -- Indication: For stroke    colchicine 0.6 mg oral tablet  -- 1 tab(s) by mouth once a day  -- Indication: For Gout    allopurinol 300 mg oral tablet  -- 1 tab(s) by mouth once a day  -- Indication: For Gout    mupirocin 2% topical cream  -- 1 application on skin 3 times a day  -- Indication: For laceration above L eye    latanoprost 0.005% ophthalmic solution  -- 1 drop(s) to each affected eye once a day (at bedtime)  -- Indication: For Glaucoma, unspecified glaucoma, unspecified laterality    timolol maleate 0.5% ophthalmic solution  -- 1 drop(s) to each affected eye once a day  -- Indication: For Glaucoma, unspecified glaucoma, unspecified laterality    pantoprazole 40 mg oral delayed release tablet  -- 1 tab(s) by mouth once a day (before a meal)  -- Indication: For Gerd    Multiple Vitamins oral tablet  -- 1 tab(s) by mouth once a day  -- Indication: For Health maintenance    folic acid 1 mg oral tablet  -- 1 tab(s) by mouth once a day  -- Indication: For Health maintenance    thiamine 100 mg oral tablet  -- 1 tab(s) by mouth once a day  -- Indication: For Alcoholism I will START or STAY ON the medications listed below when I get home from the hospital:    aspirin 81 mg oral delayed release tablet  -- 1 tab(s) by mouth once a day  -- Indication: For Heart protecion    allopurinol 300 mg oral tablet  -- 1 tab(s) by mouth once a day  -- Indication: For Gout    colchicine 0.6 mg oral tablet  -- 1 tab(s) by mouth once a day  -- Indication: For Gout    mupirocin 2% topical cream  -- 1 application on skin 3 times a day  -- Indication: For skin infection    latanoprost 0.005% ophthalmic solution  -- 1 drop(s) to each affected eye once a day (at bedtime)  -- Indication: For Glaucoma, unspecified glaucoma, unspecified laterality    timolol maleate 0.5% ophthalmic solution  -- 1 drop(s) to each affected eye once a day  -- Indication: For Glaucoma, unspecified glaucoma, unspecified laterality    pantoprazole 40 mg oral delayed release tablet  -- 1 tab(s) by mouth once a day (before a meal)  -- Indication: For GERD    Multiple Vitamins oral tablet  -- 1 tab(s) by mouth once a day  -- Indication: For Health maintenance    folic acid 1 mg oral tablet  -- 1 tab(s) by mouth once a day  -- Indication: For Health maintenance    thiamine 100 mg oral tablet  -- 1 tab(s) by mouth once a day  -- Indication: For Alcoholism I will START or STAY ON the medications listed below when I get home from the hospital:    aspirin 81 mg oral delayed release tablet  -- 1 tab(s) by mouth once a day  -- Indication: For Heart protecion    allopurinol 300 mg oral tablet  -- 1 tab(s) by mouth once a day  -- Indication: For Gout    colchicine 0.6 mg oral tablet  -- 1 tab(s) by mouth once a day  -- Indication: For Gout    amLODIPine 10 mg oral tablet  -- 1 tab(s) by mouth once a day  -- It is very important that you take or use this exactly as directed.  Do not skip doses or discontinue unless directed by your doctor.  Some non-prescription drugs may aggravate your condition.  Read all labels carefully.  If a warning appears, check with your doctor before taking.    -- Indication: For Hypertension    mupirocin 2% topical cream  -- 1 application on skin 3 times a day  -- Indication: For skin infection    latanoprost 0.005% ophthalmic solution  -- 1 drop(s) to each affected eye once a day (at bedtime)  -- Indication: For Glaucoma, unspecified glaucoma, unspecified laterality    timolol maleate 0.5% ophthalmic solution  -- 1 drop(s) to each affected eye once a day  -- Indication: For Glaucoma, unspecified glaucoma, unspecified laterality    pantoprazole 40 mg oral delayed release tablet  -- 1 tab(s) by mouth once a day (before a meal)  -- Indication: For GERD    Multiple Vitamins oral tablet  -- 1 tab(s) by mouth once a day  -- Indication: For Health maintenance    folic acid 1 mg oral tablet  -- 1 tab(s) by mouth once a day  -- Indication: For Health maintenance    thiamine 100 mg oral tablet  -- 1 tab(s) by mouth once a day  -- Indication: For Alcoholism

## 2017-04-12 NOTE — DISCHARGE NOTE ADULT - CARE PLAN
Principal Discharge DX:	Cerebrovascular accident (CVA), unspecified mechanism  Goal:	eliminate future events  Instructions for follow-up, activity and diet:	please take your medications as prescribed, please followup outpatient with Dr. Joel. Principal Discharge DX:	Transient amnesia  Goal:	control future events  Instructions for follow-up, activity and diet:	please followup outpatient with Neurology and with Psychiatry

## 2017-04-12 NOTE — PROGRESS NOTE ADULT - SUBJECTIVE AND OBJECTIVE BOX
INTERVAL HPI/OVERNIGHT EVENTS: no events    SUBJECTIVE: Patient seen and examined at bedside.     ROS:  CV: Denies chest pain  Resp: Denies SOB  GI: Denies abdominal pain, constipation, diarrhea, nausea, vomiting  : Denies dysuria  ID: Denies fevers, chills  MSK: Denies joint pain     OBJECTIVE:    VITAL SIGNS:  ICU Vital Signs Last 24 Hrs  T(C): 36.2, Max: 37.1 (04-11 @ 14:32)  T(F): 97.1, Max: 98.8 (04-11 @ 14:32)  HR: 87 (85 - 98)  BP: 118/67 (118/67 - 153/89)  BP(mean): 93 (93 - 93)  ABP: --  ABP(mean): --  RR: 17 (17 - 27)  SpO2: 93% (93% - 99%)        CAPILLARY BLOOD GLUCOSE  111 (12 Apr 2017 07:09)      PHYSICAL EXAM:  PE: General: NAD, comfortable lying in bed, A&Ox2  HEENT: PERRL, b/l ophthalmoplegia prominent with upward gaze, clear conjunctiva, MMM  Respiratory: CTA B/L, no W/R/R, no retractions  Cardiac: normal +S1/S2, RRR, no M/R/G  GI: soft, NTND, bowel sounds normoactive  Ext: No peripheral edema  Neurologic: A&Ox2 (knows self, month, not year, does not know which hospital. Pt moving all extremities, hand  strength 5/5 bilaterally, foot dorsiflexion 5/5 bilaterally, can lift both legs off the bed. Face symmetric. Mild dysarthria. Sensation to light touch intact throughout.    MEDICATIONS:  MEDICATIONS  (STANDING):  atorvastatin 80milliGRAM(s) Oral at bedtime  pantoprazole    Tablet 40milliGRAM(s) Oral before breakfast  colchicine 0.6milliGRAM(s) Oral daily  allopurinol 300milliGRAM(s) Oral daily  folic acid 1milliGRAM(s) Oral daily  multivitamin 1Tablet(s) Oral daily  aspirin enteric coated 81milliGRAM(s) Oral daily  heparin  Injectable 5000Unit(s) SubCutaneous every 8 hours  latanoprost 0.005% Ophthalmic Solution 1Drop(s) Left EYE at bedtime  timolol 0.5% Solution 1Drop(s) Right EYE daily  thiamine IVPB 250milliGRAM(s) IV Intermittent daily  insulin lispro (HumaLOG) corrective regimen sliding scale  SubCutaneous Before meals and at bedtime  mupirocin 2% Cream 1Application(s) Topical three times a day    MEDICATIONS  (PRN):  LORazepam   Injectable 2milliGRAM(s) IV Push every 6 hours PRN Agitation or withdrawal  QUEtiapine 12.5milliGRAM(s) Oral at bedtime PRN Agitation      ALLERGIES:  Allergies    No Known Allergies    Intolerances        LABS:                        13.5   8.7   )-----------( 171      ( 12 Apr 2017 06:30 )             38.9     04-12    139  |  104  |  23  ----------------------------<  105<H>  3.6   |  22  |  1.01    Ca    9.0      12 Apr 2017 06:30  Mg     2.1     04-12            RADIOLOGY & ADDITIONAL TESTS: Reviewed.

## 2017-04-12 NOTE — DISCHARGE NOTE ADULT - CARE PROVIDERS DIRECT ADDRESSES
,[email or web address],DirectAddress_Unknown ,DirectAddress_Unknown,DirectAddress_Unknown

## 2017-04-12 NOTE — DISCHARGE NOTE ADULT - PROVIDER TOKENS
TOKEN:'[email or web address]' FREE:[LAST:[Neurology],PHONE:[(   )    -],FAX:[(   )    -],ADDRESS:[[facility] Neurology  [phone]  [address] [place] [address], [address] [place] [address]]]

## 2017-04-12 NOTE — PROGRESS NOTE ADULT - PROBLEM SELECTOR PLAN 3
Goal SBP normotensive < 140 mg     -Holding home Norvasc 5mg po qd for now -home colchicine and allopurinol

## 2017-04-12 NOTE — DISCHARGE NOTE ADULT - MEDICATION SUMMARY - MEDICATIONS TO STOP TAKING
I will STOP taking the medications listed below when I get home from the hospital:    Norvasc  -- 10 milligram(s) by mouth once a day I will STOP taking the medications listed below when I get home from the hospital:  None

## 2017-04-12 NOTE — PROGRESS NOTE ADULT - PROBLEM SELECTOR PLAN 2
Concern given hx of alcohol abuse (provided by wife), pt was delirious, with visual and auditory hallucinations for >24 hours; MRI head showed no acute pathology in mamillary bodies    - Ativan 2mg IV q6 PRN for agitation/withdrawal symptoms.  - Seroquel 12.5mg po qd. Dose decreased given sedation yesterday AM.  - folic acid, multivitamin. On Wernicke encephalopathy treatment dose of Thiamin ( mg QD x 4 days)  - Monitor CIWA  - S/p fall related to agitation/sedation yesterday AM. Pt was sitting in chair and then found lying on ground, likely leaned forward, falling and hitting his head. + L sided above eye 2cm in length and 0.5-1cm deep laceration. CT head/neck negative. Plastic surgery consulted, sutured forehead laceration.  -will need outpatient dementia followup Goal SBP normotensive < 140 mg     -Holding home Norvasc 5mg po qd for now

## 2017-04-12 NOTE — PROGRESS NOTE ADULT - PROBLEM SELECTOR PLAN 1
Pt presenting with slurred speech and confusion, found to have R facial droop and word-finding difficulty on exam (NIH scale 4). STAT CT head done, found to have no intracranial hemorrhage; tPA given at 18:41PM on 4/7; admitted for post-tpa monitoring in MICU. Repeat CT head @24hr post-tPA showed no interval change, no hydrocephalus, midline shift, acute intracranial hemorrhage or demarcated territorial infarct. Currently without focal deficits on exam other than AMS, MRI brain showed no  infarcts, no masses, no hemorrhage    - PT/OT rec BELL  - aspirin 81mg daily and Lipitor 80mg po qd. Concern given hx of alcohol abuse (provided by wife), pt was delirious, with visual and auditory hallucinations for >24 hours; MRI head showed no acute pathology in mamillary bodies.  No evidence of stroke.    - Ativan 2mg IV q6 PRN for agitation/withdrawal symptoms.  - Seroquel 12.5mg po qd. Dose decreased given sedation yesterday AM.  - folic acid, multivitamin. On Wernicke encephalopathy treatment dose of Thiamin ( mg QD x 4 days)  - Monitor CIWA  - S/p fall related to agitation/sedation yesterday AM. Pt was sitting in chair and then found lying on ground, likely leaned forward, falling and hitting his head. + L sided above eye 2cm in length and 0.5-1cm deep laceration. CT head/neck negative. Plastic surgery consulted, sutured forehead laceration.  -will need outpatient dementia followup

## 2017-04-13 VITALS
DIASTOLIC BLOOD PRESSURE: 90 MMHG | RESPIRATION RATE: 18 BRPM | SYSTOLIC BLOOD PRESSURE: 167 MMHG | HEART RATE: 88 BPM | OXYGEN SATURATION: 96 % | TEMPERATURE: 99 F

## 2017-04-13 DIAGNOSIS — T14.8 OTHER INJURY OF UNSPECIFIED BODY REGION: ICD-10-CM

## 2017-04-13 PROCEDURE — 84484 ASSAY OF TROPONIN QUANT: CPT

## 2017-04-13 PROCEDURE — 85730 THROMBOPLASTIN TIME PARTIAL: CPT

## 2017-04-13 PROCEDURE — 85027 COMPLETE CBC AUTOMATED: CPT

## 2017-04-13 PROCEDURE — 86900 BLOOD TYPING SEROLOGIC ABO: CPT

## 2017-04-13 PROCEDURE — 82248 BILIRUBIN DIRECT: CPT

## 2017-04-13 PROCEDURE — 86850 RBC ANTIBODY SCREEN: CPT

## 2017-04-13 PROCEDURE — 85610 PROTHROMBIN TIME: CPT

## 2017-04-13 PROCEDURE — 83735 ASSAY OF MAGNESIUM: CPT

## 2017-04-13 PROCEDURE — 97116 GAIT TRAINING THERAPY: CPT

## 2017-04-13 PROCEDURE — 82553 CREATINE MB FRACTION: CPT

## 2017-04-13 PROCEDURE — 95951: CPT

## 2017-04-13 PROCEDURE — 80053 COMPREHEN METABOLIC PANEL: CPT

## 2017-04-13 PROCEDURE — 82977 ASSAY OF GGT: CPT

## 2017-04-13 PROCEDURE — 86901 BLOOD TYPING SEROLOGIC RH(D): CPT

## 2017-04-13 PROCEDURE — 86780 TREPONEMA PALLIDUM: CPT

## 2017-04-13 PROCEDURE — 84100 ASSAY OF PHOSPHORUS: CPT

## 2017-04-13 PROCEDURE — 82550 ASSAY OF CK (CPK): CPT

## 2017-04-13 PROCEDURE — 99223 1ST HOSP IP/OBS HIGH 75: CPT

## 2017-04-13 PROCEDURE — 82607 VITAMIN B-12: CPT

## 2017-04-13 PROCEDURE — 70450 CT HEAD/BRAIN W/O DYE: CPT

## 2017-04-13 PROCEDURE — 70551 MRI BRAIN STEM W/O DYE: CPT

## 2017-04-13 PROCEDURE — 70498 CT ANGIOGRAPHY NECK: CPT

## 2017-04-13 PROCEDURE — 83036 HEMOGLOBIN GLYCOSYLATED A1C: CPT

## 2017-04-13 PROCEDURE — 96374 THER/PROPH/DIAG INJ IV PUSH: CPT | Mod: XU

## 2017-04-13 PROCEDURE — 70486 CT MAXILLOFACIAL W/O DYE: CPT

## 2017-04-13 PROCEDURE — 99291 CRITICAL CARE FIRST HOUR: CPT | Mod: 25

## 2017-04-13 PROCEDURE — 82746 ASSAY OF FOLIC ACID SERUM: CPT

## 2017-04-13 PROCEDURE — 97162 PT EVAL MOD COMPLEX 30 MIN: CPT

## 2017-04-13 PROCEDURE — 97161 PT EVAL LOW COMPLEX 20 MIN: CPT

## 2017-04-13 PROCEDURE — 93005 ELECTROCARDIOGRAM TRACING: CPT

## 2017-04-13 PROCEDURE — 36415 COLL VENOUS BLD VENIPUNCTURE: CPT

## 2017-04-13 PROCEDURE — 72125 CT NECK SPINE W/O DYE: CPT

## 2017-04-13 PROCEDURE — 93306 TTE W/DOPPLER COMPLETE: CPT

## 2017-04-13 PROCEDURE — 84443 ASSAY THYROID STIM HORMONE: CPT

## 2017-04-13 PROCEDURE — 95951: CPT | Mod: 26

## 2017-04-13 PROCEDURE — 85025 COMPLETE CBC W/AUTO DIFF WBC: CPT

## 2017-04-13 PROCEDURE — 80048 BASIC METABOLIC PNL TOTAL CA: CPT

## 2017-04-13 PROCEDURE — 70496 CT ANGIOGRAPHY HEAD: CPT

## 2017-04-13 PROCEDURE — 80061 LIPID PANEL: CPT

## 2017-04-13 RX ORDER — COLCHICINE 0.6 MG
1 TABLET ORAL
Qty: 30 | Refills: 0 | OUTPATIENT
Start: 2017-04-13 | End: 2017-05-13

## 2017-04-13 RX ORDER — ALLOPURINOL 300 MG
1 TABLET ORAL
Qty: 30 | Refills: 0 | OUTPATIENT
Start: 2017-04-13 | End: 2017-05-13

## 2017-04-13 RX ORDER — FOLIC ACID 0.8 MG
1 TABLET ORAL
Qty: 30 | Refills: 0 | OUTPATIENT
Start: 2017-04-13 | End: 2017-05-13

## 2017-04-13 RX ORDER — LATANOPROST 0.05 MG/ML
1 SOLUTION/ DROPS OPHTHALMIC; TOPICAL
Qty: 5 | Refills: 0 | OUTPATIENT
Start: 2017-04-13 | End: 2017-05-13

## 2017-04-13 RX ORDER — ASPIRIN/CALCIUM CARB/MAGNESIUM 324 MG
1 TABLET ORAL
Qty: 30 | Refills: 0 | OUTPATIENT
Start: 2017-04-13 | End: 2017-05-13

## 2017-04-13 RX ORDER — MUPIROCIN 20 MG/G
1 OINTMENT TOPICAL
Qty: 1 | Refills: 0 | OUTPATIENT
Start: 2017-04-13 | End: 2017-05-13

## 2017-04-13 RX ORDER — TIMOLOL 0.5 %
1 DROPS OPHTHALMIC (EYE)
Qty: 1 | Refills: 0 | OUTPATIENT
Start: 2017-04-13 | End: 2017-05-13

## 2017-04-13 RX ORDER — PANTOPRAZOLE SODIUM 20 MG/1
1 TABLET, DELAYED RELEASE ORAL
Qty: 30 | Refills: 0 | OUTPATIENT
Start: 2017-04-13 | End: 2017-05-13

## 2017-04-13 RX ORDER — THIAMINE MONONITRATE (VIT B1) 100 MG
1 TABLET ORAL
Qty: 30 | Refills: 0 | OUTPATIENT
Start: 2017-04-13 | End: 2017-05-13

## 2017-04-13 RX ORDER — QUETIAPINE FUMARATE 200 MG/1
0.5 TABLET, FILM COATED ORAL
Qty: 0 | Refills: 0 | COMMUNITY

## 2017-04-13 RX ORDER — AMLODIPINE BESYLATE 2.5 MG/1
1 TABLET ORAL
Qty: 14 | Refills: 0 | OUTPATIENT
Start: 2017-04-13 | End: 2017-04-27

## 2017-04-13 RX ADMIN — Medication 102.5 MILLIGRAM(S): at 13:32

## 2017-04-13 RX ADMIN — Medication 81 MILLIGRAM(S): at 13:32

## 2017-04-13 RX ADMIN — MUPIROCIN 1 APPLICATION(S): 20 OINTMENT TOPICAL at 13:29

## 2017-04-13 RX ADMIN — Medication 0.6 MILLIGRAM(S): at 13:29

## 2017-04-13 RX ADMIN — MUPIROCIN 1 APPLICATION(S): 20 OINTMENT TOPICAL at 06:24

## 2017-04-13 RX ADMIN — Medication 1 DROP(S): at 10:07

## 2017-04-13 RX ADMIN — HEPARIN SODIUM 5000 UNIT(S): 5000 INJECTION INTRAVENOUS; SUBCUTANEOUS at 13:28

## 2017-04-13 RX ADMIN — Medication 300 MILLIGRAM(S): at 13:27

## 2017-04-13 RX ADMIN — Medication 1 TABLET(S): at 13:32

## 2017-04-13 RX ADMIN — Medication 1 MILLIGRAM(S): at 13:29

## 2017-04-13 NOTE — PROGRESS NOTE ADULT - SUBJECTIVE AND OBJECTIVE BOX
Stroke Neurology Follow-Up Visit    Pt refused rehab and refused blood draws. Wishes to go home.   Pt seen and examined. EEG on since last night.   Wife states he is at his baseline. Has been having cognitive decline in the past year, but currently at his prehospitalization baseline.   No complaints of headache, nausea, vomiting, difficulty word finding, slurred speech, weakness, or numbness.     Exam:  T(F): 98.8, Max: 98.8 (04-13 @ 11:05)  HR: 88 (80 - 88)  BP: 167/90 (120/62 - 167/90)  RR: 18 (16 - 18)  SpO2: 96% (95% - 96%)    Gen: No acute distress, calm, cooperative, in NAD   Head: left forehead (at eyebrow) swelling and sutured laceration-slightly ecchymotic   Neuro:  Mental status: awake, alert, oriented to self, birthday, age, and year. Briskly responds to questions. Recognizes his wife. States he is on an airplane at this time. Does not know why he is in the hospital. Follows all simple commands. Difficulty with complex commands. Mild dysarthria, speech is spontaneous. Able to name and repeat. Able to read the newspaper. No aphasia.     Cranial nerves: Pupils equally round and reactive to light, 3 mm,  VFF, no nystagmus, EOMI-improved upward gaze. V1 through V3 intact bilaterally and symmetric, face symmetric, hearing intact to finger rub, palate elevation symmetric, tongue was midline, sternocleidomastoid/shoulder shrug strength bilaterally 5/5.    Motor:  Normal bulk and tone. No pronator drift.  strength strong bilaterally. All extremities 5/5 motor strength.    Sensation: Intact and symmetric to light touch.  Withdraws briskly of all extremities to painful stimuli. No neglect.   Coordination: No dysmetria with finger to nose bilaterally.     MEDICATIONS  (STANDING):  atorvastatin 80milliGRAM(s) Oral at bedtime  pantoprazole    Tablet 40milliGRAM(s) Oral before breakfast  colchicine 0.6milliGRAM(s) Oral daily  allopurinol 300milliGRAM(s) Oral daily  folic acid 1milliGRAM(s) Oral daily  multivitamin 1Tablet(s) Oral daily  aspirin enteric coated 81milliGRAM(s) Oral daily  heparin  Injectable 5000Unit(s) SubCutaneous every 8 hours  latanoprost 0.005% Ophthalmic Solution 1Drop(s) Left EYE at bedtime  timolol 0.5% Solution 1Drop(s) Right EYE daily  thiamine IVPB 250milliGRAM(s) IV Intermittent daily  insulin lispro (HumaLOG) corrective regimen sliding scale  SubCutaneous Before meals and at bedtime  mupirocin 2% Cream 1Application(s) Topical three times a day    MEDICATIONS  (PRN):  LORazepam   Injectable 2milliGRAM(s) IV Push every 6 hours PRN Agitation or withdrawal  QUEtiapine 12.5milliGRAM(s) Oral at bedtime PRN Agitation      Labs:                        13.5   8.7   )-----------( 171      ( 12 Apr 2017 06:30 )             38.9     04-12    139  |  104  |  23  ----------------------------<  105<H>  3.6   |  22  |  1.01    Ca    9.0      12 Apr 2017 06:30  Mg     2.1     04-12    Thyroid Stimulating Hormone, Serum: 2.515 uIU/mL  Hemoglobin A1C, Whole Blood: 6.9 %  Cholesterol, Serum: 177 mg/dL  LDL 68   Triglycerides, Serum: 355 mg/dL  HDL Cholesterol, Serum: 38 mg/dL    Radiology:  4/7 CT head:   IMPRESSION:   No acute intracranial abnormality. Specifically, no acute intracranial   hemorrhage, mass effect or recent transcortical or territorial   infarction. Minimal small vessel ischemic disease.    4/7 CTA head/neck   IMPRESSION:   No hemodynamically significant carotid or vertebral artery stenosis. Left   vertebral artery is dominant.  IMPRESSION: Intracranial CTA demonstrates no proximal vessel occlusion or   high-grade stenosis about the [place] of [place].    4/8 CT head:   IMPRESSION:  No interval change.  No hydrocephalus, midline shift, acute intracranial hemorrhage or   demarcated territorial infarct.  Chronic white matter microangiopathic ischemic disease.    4/10 CT head   IMPRESSION:   1. No evidence of acute intracranial hemorrhage and no apparent acute   abnormality.  2. Parenchymal volume loss and chronic microangiopathic disease, stable.    4/10 Ct cervical spine:   IMPRESSION:   1. No CT evidence of acute osseous injury.  2. Multilevel degenerative osteoarthritis and disc disease.  3. Degenerative anterolisthesis of C5 with respect to C4.    4/11 CT Maxillofacial:   IMPRESSION:-  1.  No fracture in the facial bones.  2.  Focal soft-tissue swelling in the left lateral periorbital and left malar regions.  3.  Xiomy mandibulares are noted.     4/11 MRI head w/o:   IMPRESSION:-  1.  An incomplete study.  2.  No intracranial hemorrhage, mass or acute/subacute infarct.    Assessment & Plan:  61 year-old man with etoh abuse, hypertension, RA presented with slurred speech, confusion, right facial droop, word-finding difficulty s/p tPA on 4/7 for possible stroke.  Course complicated by delirium/agitation possibly related to alcohol withdrawal/ toxic metabolic encephalopathy.  Per wife, he has become progressively more confused in the last year.  Has developed cognitive issues as well.  Course complicated on 4/10 with a fall-attempted to get out of chair unassisted, sustained left eyebrow laceration s/p sutures.     -MRI head: NOT consistent with acute stroke.    -CTA head/neck without large vessel occlusion or stenosis   -as per wife, pt is currently back to his baseline; has been having cognitive issues in the past year   -confusion from Wernicke's encephalopathy possible.  Continue thiamine.  Needs outpatient work-up for progressive cognitive decline and confusion per wife-will refer to outpatient neuropsych testing.    -EEG negative, no seizure activity   -Continue aspirin 81 mg daily for primary stroke prevention   -Continue Lipitor 80mg po qd.    -hx of ETOH abuse - Continue thiamine, MVI, and folic acid   -agitation- psych consulted, on seroquel prn.  monitor qtc  -pt was refusing BLEL; reevaluated by physical therapy, recommended now for home with home PT  -given pt is back to his baseline, EEG negative, MRI head negative for stroke - stable for discharge back to home today   -discussed plans with wife, is agreeable with plan and agreeable for discharge today

## 2017-04-13 NOTE — PROGRESS NOTE ADULT - PROBLEM SELECTOR PLAN 8
HSQ    CODE: FULL    DISPO: step down to F
HS    CODE: FULL    DISPO: BELL
HSQ

## 2017-04-13 NOTE — PROGRESS NOTE ADULT - PROBLEM SELECTOR PLAN 6
S/p fall related to agitation/sedation yesterday AM. Pt was sitting in chair and then found lying on ground, likely leaned forward, falling and hitting his head. + L sided above eye 2cm in length and 0.5-1cm deep laceration. CT head/neck negative. Plastic surgery consulted, sutured forehead laceration.

## 2017-04-13 NOTE — PROGRESS NOTE ADULT - PROBLEM SELECTOR PLAN 1
Concern given hx of alcohol abuse (provided by wife), pt was delirious, with visual and auditory hallucinations for >24 hours; MRI head showed no acute pathology in mamillary bodies.  No evidence of stroke.    - Ativan 2mg IV q6 PRN for agitation/withdrawal symptoms.  - Seroquel 12.5mg po QHS prn  - folic acid, multivitamin. On Wernicke encephalopathy treatment dose of Thiamin ( mg QD x 4 days)  - Monitor CIWA  - f/u psych consult  - enhanced supervision  - VEEG

## 2017-04-13 NOTE — PROGRESS NOTE ADULT - PROVIDER SPECIALTY LIST ADULT
Internal Medicine
MICU
Neurology
Rehab Medicine
Rehab Medicine
Internal Medicine

## 2017-04-13 NOTE — PROGRESS NOTE ADULT - SUBJECTIVE AND OBJECTIVE BOX
Epilepsy Attending Preliminary EEG Report    EEG reviewed from 4/12 1722 -> 4/13 0700: Normal, no EEG seizures, epileptiform discharges, or abnormal clinical events.    Full report to be submitted after disconnection of the EEG.

## 2017-04-13 NOTE — CONSULT NOTE ADULT - SUBJECTIVE AND OBJECTIVE BOX
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 HTN, and h/o alcohol use disorder in FSR along with progressive cognitive impairment    HPI:     Per documentation, pt presents to ED BIBEMS after co-workers noticed that patient was having slurred speech, and looked confused at 5pm on day of admission. As per patient, he had not noticed any change in himself and was comfortable at work until he suddenly felt something was "off", although he can not specify exactly what, so he took a break from his duties, sat down, and that's when his coworkers noticed his neuropsychiatric alternation and called EMS. ED physician documentation notes that patient did not feel any weakness, lightheadedness, numbness, trouble with his vision, or notice any slurred speech. He evidently did report vertigo and later endorsed light-headedness with the incident per neurology consult.  The episode resolved in 15 minutes and pt was asymptomatic on eval by ED physician.    Before arriving to ED, stroke code was called. CT was done stat and showed no intracranial bleeding or obstruction, and CTA negative. In the ED, patient's exam was significant for difficulty with word finding, and mild facial droop was noted and Dr. Beltran noted a mild pronator drift,  At 18:41PM tPA bolus was given and drip started at 18:43 PM.   In the ED, vital signs were BP: 195/94mmHg; HR: 80; RR: 18; O2Sat: 99% on RA; Temp: 99 (07 Apr 2017 22:57)    Collateral from wife indicates that pt has had progressive decline in cognitive function with specific difficulties finding his way home or correctly orienting himself, e.g. when he leaves a restaurant or bar.  She noted that pt has a long h/o alcohol use disorder, though reportedly in remission, but his last drink was last week with just 1 drink.  However, less quantity has had more substantive effects for pt.  Course of decline variably noted with onset of 1 yr to a few mos ago.    Pt was placed on bed rest, with SCD for DVT ppx, recommended for TTE and cardiac w/u, but brain MRI was further suggested as opposed to CT to r/o hemorrhage so as to also evaluate pt's mamillary bodies, along with EEG, and, if w/u negative, pursue possible outpatient PET/CT to further evaluate for dementia.  B12/ Fol and TSH wnl, and RPR NR.  FA, MVI ,and Thiamine IV started.      Pt was noted to be agitated and was ordered for quetiapine 25 mg daily, then lowered to 12.5 mg daily due to sedation at higher dose, continued on 12.5 mg at bedtime prn which was mildly effective, but it has since been recommended to be discontinued with resolution of agitation, but continued standing but lowered due to sedation.      Over the weekend, pt had a fall from chair in hospital with laceration to forehead, and suture by plastic surgery.  Repeat HCT r/o stroke, and pt is w/o residual focal deficits, but confirmed parenchymal volume loss and chronic microangiopathic ds, maxillofacial CT showed no acute pathology.  ASA 81 mg daily started.   Pt was found to have upward ophthalmoplegia along with confusion and dysmetria concerning for Wernicke's.  He did not tolerate MRI initially, study eventually done but limited, though no evidence for acute process.  Thiamine continued at WE dosing.      Pt has remained on 1:1 intended as enhanced supervision as advised, since there has been no indication for CO from a psychiatric standpoint.      Pt was planned for dispo to [facility], with initial PT eval demonstrating significant physical impairment requiring 2-person assist for transfer, but pt has refused, and wife who is his surrogate decision maker agrees to comply with his request even as he lacks the capacity for this decision.  However, PT indicates substantial improvement today, risking from his bed independently, such that preliminary rec made for PT at home.    Pt denies any suicidal ideation or depression.  He minimized any cognitive impairment.  Wife corroborates pt claim of not drinking alcohol, sober for 15-20 yrs prior to which he did, however, have active alcohol use disorder- he "lived at the bar", before they  20 yrs ago.  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 She notes that he stopped drinking under advisement and her pressure for risk of job loss.  She searched his locker at work and found no sign of alcohol, and co-workers are not aware of any drinking or AOB.  They do note that he has had memory loss, e.g. with recent move unable to remember his address.  She notes that he recently went to the wrong street on returning home and confabulated that he was going out.      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 HTN, and h/o alcohol use disorder in FSR along with progressive cognitive impairment    HPI:     Per documentation, pt presents to ED BIBEMS after co-workers noticed that patient was having slurred speech, and looked confused at 5pm on day of admission. As per patient, he had not noticed any change in himself and was comfortable at work until he suddenly felt something was "off", although he can not specify exactly what, so he took a break from his duties, sat down, and that's when his coworkers noticed his neuropsychiatric alternation and called EMS. ED physician documentation notes that patient did not feel any weakness, lightheadedness, numbness, trouble with his vision, or notice any slurred speech. He evidently did report vertigo and later endorsed light-headedness with the incident per neurology consult.  The episode resolved in 15 minutes and pt was asymptomatic on eval by ED physician.    Before arriving to ED, stroke code was called. CT was done stat and showed no intracranial bleeding or obstruction, and CTA negative. In the ED, patient's exam was significant for difficulty with word finding, and mild facial droop was noted and Dr. Beltran noted a mild pronator drift,  At 18:41PM tPA bolus was given and drip started at 18:43 PM.   In the ED, vital signs were BP: 195/94mmHg; HR: 80; RR: 18; O2Sat: 99% on RA; Temp: 99 (07 Apr 2017 22:57)    Collateral from wife indicates that pt has had progressive decline in cognitive function with specific difficulties finding his way home or correctly orienting himself, e.g. when he leaves a restaurant or bar.  She noted that pt has a long h/o alcohol use disorder, though reportedly in remission, but his last drink was last week with just 1 drink.  However, less quantity has had more substantive effects for pt.  Course of decline variably noted with onset of 1 yr to a few mos ago.    Pt was placed on bed rest, with SCD for DVT ppx, recommended for TTE and cardiac w/u, but brain MRI was further suggested as opposed to CT to r/o hemorrhage so as to also evaluate pt's mamillary bodies, along with EEG, and, if w/u negative, pursue possible outpatient PET/CT to further evaluate for dementia.  B12/ Fol and TSH wnl, and RPR NR.  FA, MVI ,and Thiamine IV started.      Pt was noted to be agitated and was ordered for quetiapine 25 mg daily, then lowered to 12.5 mg daily due to sedation at higher dose, continued on 12.5 mg at bedtime prn which was mildly effective, but it has since been recommended to be discontinued with resolution of agitation, but continued standing but lowered due to sedation.      Over the weekend, pt had a fall from chair in hospital with laceration to forehead, and suture by plastic surgery.  Repeat HCT r/o stroke, and pt is w/o residual focal deficits, but confirmed parenchymal volume loss and chronic microangiopathic ds, maxillofacial CT showed no acute pathology.  ASA 81 mg daily started.   Pt was found to have upward ophthalmoplegia along with confusion and dysmetria concerning for Wernicke's.  He did not tolerate MRI initially, study eventually done but limited, though no evidence for acute process.  Thiamine continued at WE dosing.      Pt has remained on 1:1 intended as enhanced supervision as advised, since there has been no indication for CO from a psychiatric standpoint.      Pt was planned for dispo to [facility], with initial PT eval demonstrating significant physical impairment requiring 2-person assist for transfer, but pt has refused, and wife who is his surrogate decision maker agrees to comply with his request even as he lacks the capacity for this decision.  However, PT indicates substantial improvement today, risking from his bed independently, such that preliminary rec made for PT at home.    Pt denies any suicidal ideation or depression.  He minimized any cognitive impairment.  Wife corroborates pt claim of not drinking alcohol, sober for 15-20 yrs prior to which he did, however, have active alcohol use disorder- he "lived at the bar", before they  20 yrs ago.  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 She notes that he stopped drinking under advisement and her pressure for risk of job loss.  She searched his locker at work and found no sign of alcohol, and co-workers are not aware of any drinking or AOB.  They do note that he has had memory loss, e.g. with recent move unable to remember his address.  She notes that he recently went to the wrong street on returning home and confabulated that he was going out.      PMH:  gout, RA?, HTN, glaucoma    PPH:  Denies any h/o psychiatric hospitalization, tx, psychotropic medications, dx, or prominent sxs including mood disturbance, depression, or anxiety  Wife notes deteriorating cognitive status over a few mos to 1 year, corroborates that he does not have any known h/o psychiatric treatment or issues    Substance:  EtOH- + alcohol use disorder reportedly in [place] x 15-20 yrs per wife who indicates he "lived at a bar" before then and their marriage 20 yrs ago, after which she urged him to stop partly for risk of job loss.  No EtOH is kept in the house per her report.  He evidently does drink occasionally, last just 1 drink 1 wk ago.    Refuses AA.  No h/o w/d, sz.  No h/o inpatient rehab or IOP.    MJ- experimented as teen, once but did not like    SH:  x 20 yrs, no children.  Works as a doorman.  Wife with childhood petit-mal epilepsy, on AED, with neuro f/u by Dr. Garcia.  B&R in [facility] where he currently lives with his wife.  Just short of degree from [employer].    FH: none noted of relevance    ROS: 12-system review negative other than that noted on neuropsych in HPI    MSE:  Jose- leads with bandage around head  Beh- seated in bed, no PMR/A, no tremor, calm  Att- guarded, appropriate  Speech- nl r/r/v  TP- l/l/c  Associations- goal-directed  TC- goal-oriented, no S/HI, no perceptual disturbance, no paranoid ideation or delusional material  Mood- "fine"  Aff- constricted, full, stable, appropriate  Sensorium- fully awake and alert, but disoriented to place (could not name hospital)   Recall- impaired grossly  Attention- impaired grossly, not tested  Language- intact presently  Fund of knowledge- intact  I/J- limited    VS:  Vital Signs Last 24 Hrs  T(C): 37.1, Max: 37.1 (04-13 @ 11:05)  T(F): 98.8, Max: 98.8 (04-13 @ 11:05)  HR: 88 (80 - 88)  BP: 167/90 (120/62 - 167/90)  BP(mean): --  RR: 18 (16 - 18)  SpO2: 96% (95% - 96%)    Labs:  Basic Metabolic Panel (04.12.17 @ 06:30)    Sodium, Serum: 139 mmoL/L    Potassium, Serum: 3.6 mmoL/L    Chloride, Serum: 104 mmoL/L    Carbon Dioxide, Serum: 22 mmoL/L    Anion Gap, Serum: 13 mmoL/L    Blood Urea Nitrogen, Serum: 23 mg/dL    Creatinine, Serum: 1.01 mg/dL    Glucose, Serum: 105 mg/dL    Calcium, Total Serum: 9.0 mg/dL    Magnesium, Serum (04.12.17 @ 06:30)    Magnesium, Serum: 2.1 mg/dL    Phosphorus Level, Serum in AM (04.10.17 @ 06:23)    Phosphorus Level, Serum: 3.6 mg/dL     (04.09.17 @ 05:37)    Protein Total, Serum: 6.9 g/dL    Albumin, Serum: 3.6 g/dL    Bilirubin Total, Serum: 1.5 mg/dL    Alkaline Phosphatase, Serum: 117 U/L    Aspartate Aminotransferase (AST/SGOT): 19 U/L    Alanine Aminotransferase (ALT/SGPT): 44 U/L      Complete Blood Count (04.12.17 @ 06:30)    WBC Count: 8.7 K/uL    RBC Count: 4.30 M/uL    Hemoglobin: 13.5 g/dL    Hematocrit: 38.9 %    Mean Cell Volume: 90.5 fL    Mean Cell Hemoglobin: 31.4 pg    Mean Cell Hemoglobin Conc: 34.7 g/dL    Red Cell Distrib Width: 13.5 %    Platelet Count - Automated: 171 K/uL    Syphilis Screen (04.10.17 @ 15:15)    Treponema Pallidum Antibody Interpretation: Negative    Folate, Serum (04.10.17 @ 15:15)    Folate, Serum: 19.6 ng/mL    Vitamin B12, Serum (04.10.17 @ 15:15)    Vitamin B12, Serum: 306 pg/mL    Thyroid Stimulating Hormone, Serum (04.07.17 @ 18:00)    Thyroid Stimulating Hormone, Serum: 2.515 uIU/mL    Imaging:     CT BRAIN    04/07/2017  No acute intracranial abnormality. Specifically, no acute intracranial   hemorrhage, mass effect or recent transcortical or territorial   infarction. Minimal small vessel ischemic disease.    04/07/2017  CT ANGIO NECK  No hemodynamically significant carotid or vertebral artery stenosis. Left   vertebral artery is dominant.    CT ANGIO BRAIN  Intracranial CTA demonstrates no proximal vessel occlusion or   high-grade stenosis about the [place].    CT BRAIN   04/08/2017  No interval change.  No hydrocephalus, midline shift, acute intracranial hemorrhage or   demarcated territorial infarct.  Chronic white matter microangiopathic ischemic disease.     CT BRAIN  04/10/2017  1. No evidence of acute intracranial hemorrhage and no apparent acute   abnormality.  2. Parenchymal volume loss and chronic microangiopathic disease, stable.    MR BRAIN WO CONTRAST    04/11/2017 1.  An incomplete study.    2.  No intracranial hemorrhage, mass or acute/subacute infarct.    EEG reviewed from 4/12 1722 -> 4/13 0700: Normal, no EEG seizures, epileptiform discharges, or abnormal clinical events.    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 HTN, and h/o alcohol use disorder in FSR along with progressive cognitive impairment, neuro w/u inconsistent with any infectious etiology, CVA, or sz as cause for acute AMS, possibly acute TIA vs WE also potentially contributing to course c/b delirium which is less likely related to EtOH intoxication or w/d, with evident progressive cognitive decline over several mos suggestive of possible dementing illness requiring further assessment with Ddx of dementia.    Risk Assessment:  pt is showing subacute progressive cognitive decline for which he will need increasing support, but he is at no acutely elevated risk now with acute episode of AMS and focal neurologic impairment resolved    Differential Diagnosis:  Dementia- Korsakoff's Syndrome vs. vascular dementia vs. Alzheimer's dementia, vs. dementia of mixed etiology, vs. other dementia.    Recommendations:  - psychiatrically clear for discharge, no CO    - plan seems to have shifted toward discharge home with PT     - would c/w quetiapine 12.5 mg PO q6h prn, MDD 2, as pt/ wife advised, with management for now by PMD as discussed- declines psychiatric referral    - refer for neuropsychological evaluation in outpatient setting, with recommendations d/w wife, including referring directly to insurance coverage for local providers; contact with office of her neurologist, Dr. Garcia, advised also for local referral, and attempted but N/A until next wk- office staff recalled a Dr. Jimenez at [facility] but not certain.  Additionally, would recommend [facility] [place] [phone]; or [facility] [phone]    - MI and further assessment/ engagement around persisting alcohol use disorder about which he presently denies, so not accepting referral for further addiction tx or eval as reviewed with pt

## 2017-04-13 NOTE — PROGRESS NOTE ADULT - ATTENDING COMMENTS
Pt seen and examined.    Much more alert and interactive today.  Pleasant.  No agitation.    EEG negative.  MRI brain unremarkable.    Back to baseline per wife.     Will need outpatient neurologic work-up for cognitive decline over last one year.     Discussed importance of alcohol cessation.     Discharge home today.
Patient seen and examined with house-staff during bedside rounds.  Resident note read, including vitals, physical findings, laboratory data, and radiological reports.   Revisions included below.  Direct personal management at bed side and extensive interpretation of the data.  Plan was outlined and discussed in details with the housestaff.  Decision making of high complexity.  the mental status is not stable. Patient has a was sitting in the bed with head trauma. Plus to call for suturing of the laceration. CT scan of the head was done stat the cervical spine. I discussed the case with neurology. Patient on high does of thiamine. Fall and dementia workup. Patient had for possible CVA
noted edited.

## 2017-04-13 NOTE — PROGRESS NOTE ADULT - SUBJECTIVE AND OBJECTIVE BOX
INTERVAL HPI/OVERNIGHT EVENTS: patient refused meds and labs    SUBJECTIVE: Patient seen and examined at bedside.     ROS:  CV: Denies chest pain  Resp: Denies SOB  GI: Denies abdominal pain, constipation, diarrhea, nausea, vomiting  : Denies dysuria  ID: Denies fevers, chills  MSK: Denies joint pain     OBJECTIVE:    VITAL SIGNS:  ICU Vital Signs Last 24 Hrs  T(C): 36.9, Max: 36.9 (04-13 @ 04:58)  T(F): 98.4, Max: 98.4 (04-13 @ 04:58)  HR: 83 (80 - 88)  BP: 143/72 (118/67 - 143/72)  BP(mean): --  ABP: --  ABP(mean): --  RR: 16 (16 - 20)  SpO2: 95% (89% - 95%)        CAPILLARY BLOOD GLUCOSE  97 (12 Apr 2017 23:46)      PHYSICAL EXAM:  General: NAD, comfortable lying in bed, A&Ox2  HEENT: PERRL, b/l ophthalmoplegia prominent with upward gaze, clear conjunctiva, MMM  Respiratory: CTA B/L, no W/R/R, no retractions  Cardiac: normal +S1/S2, RRR, no M/R/G  GI: soft, NTND, bowel sounds normoactive  Ext: No peripheral edema  Neurologic: A&Ox2 (knows self, month, not year, does not know which hospital. Pt moving all extremities, hand  strength 5/5 bilaterally, foot dorsiflexion 5/5 bilaterally, can lift both legs off the bed. Face symmetric. Mild dysarthria. Sensation to light touch intact throughout.    MEDICATIONS:  MEDICATIONS  (STANDING):  atorvastatin 80milliGRAM(s) Oral at bedtime  pantoprazole    Tablet 40milliGRAM(s) Oral before breakfast  colchicine 0.6milliGRAM(s) Oral daily  allopurinol 300milliGRAM(s) Oral daily  folic acid 1milliGRAM(s) Oral daily  multivitamin 1Tablet(s) Oral daily  aspirin enteric coated 81milliGRAM(s) Oral daily  heparin  Injectable 5000Unit(s) SubCutaneous every 8 hours  latanoprost 0.005% Ophthalmic Solution 1Drop(s) Left EYE at bedtime  timolol 0.5% Solution 1Drop(s) Right EYE daily  thiamine IVPB 250milliGRAM(s) IV Intermittent daily  insulin lispro (HumaLOG) corrective regimen sliding scale  SubCutaneous Before meals and at bedtime  mupirocin 2% Cream 1Application(s) Topical three times a day    MEDICATIONS  (PRN):  LORazepam   Injectable 2milliGRAM(s) IV Push every 6 hours PRN Agitation or withdrawal  QUEtiapine 12.5milliGRAM(s) Oral at bedtime PRN Agitation      ALLERGIES:  Allergies    No Known Allergies    Intolerances        LABS:                        13.5   8.7   )-----------( 171      ( 12 Apr 2017 06:30 )             38.9     04-12    139  |  104  |  23  ----------------------------<  105<H>  3.6   |  22  |  1.01    Ca    9.0      12 Apr 2017 06:30  Mg     2.1     04-12            RADIOLOGY & ADDITIONAL TESTS: Reviewed.

## 2017-04-13 NOTE — PROGRESS NOTE ADULT - ASSESSMENT
Pt is a 62yo M with HTN, glaucoma, gout, alcohol abuse and RA who presented with slurred speech, confusion, R facial droop and word-finding difficulty (NIH scale 4), now s/p tPA on 4/7, course complicated by delirium possibly 2/2 alcohol withdrawal/toxic metabolic encephalopathy, currently improved, stepped down to 7 Lach and now stepped down to GMF.  Not a stroke.
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
## 2017-04-17 DIAGNOSIS — M06.9 RHEUMATOID ARTHRITIS, UNSPECIFIED: ICD-10-CM

## 2017-04-17 DIAGNOSIS — S01.112A LACERATION WITHOUT FOREIGN BODY OF LEFT EYELID AND PERIOCULAR AREA, INITIAL ENCOUNTER: ICD-10-CM

## 2017-04-17 DIAGNOSIS — H40.9 UNSPECIFIED GLAUCOMA: ICD-10-CM

## 2017-04-17 DIAGNOSIS — F10.231 ALCOHOL DEPENDENCE WITH WITHDRAWAL DELIRIUM: ICD-10-CM
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
## 2017-04-17 DIAGNOSIS — Z78.1 PHYSICAL RESTRAINT STATUS: ICD-10-CM

## 2017-04-17 DIAGNOSIS — M1A.9XX0 CHRONIC GOUT, UNSPECIFIED, WITHOUT TOPHUS (TOPHI): ICD-10-CM

## 2017-04-17 DIAGNOSIS — I63.9 CEREBRAL INFARCTION, UNSPECIFIED: ICD-10-CM

## 2017-04-17 DIAGNOSIS — F19.10 OTHER PSYCHOACTIVE SUBSTANCE ABUSE, UNCOMPLICATED: ICD-10-CM

## 2017-04-17 DIAGNOSIS — G45.4 TRANSIENT GLOBAL AMNESIA: ICD-10-CM

## 2017-04-17 DIAGNOSIS — I10 ESSENTIAL (PRIMARY) HYPERTENSION: ICD-10-CM

## 2023-03-21 NOTE — PROGRESS NOTE ADULT - SUBJECTIVE AND OBJECTIVE BOX
Physical Medicine and Rehabilitation Progress Note    MARIO MAY    MRN-[id number]    Patient is a 61y old  Male who presents with a chief complaint of Slurred speech (12 Apr 2017 10:53)      Vital Signs Last 24 Hrs  T(C): 37.1, Max: 37.1 (04-13 @ 11:05)  T(F): 98.8, Max: 98.8 (04-13 @ 11:05)  HR: 88 (80 - 88)  BP: 167/90 (120/62 - 167/90)  BP(mean): --  RR: 18 (16 - 18)  SpO2: 96% (95% - 96%)    Current Functional Status in Physical Therapy:Seated at edge of bed. alert and stable. confused and disoriented.n Deconditioned. 4/5, no focqal deficits. able to stand with supervision    Bed Mobility:supervision    Transfers: supervision    Ambulation:supervision/contact guarding without device.    Impression: 1. ? dementia/metabolic encephalopathy/alcohol withdrawal  2. Deconditioned  3. ?TIA                                                                                                                Recommendation 1. Continue PT  2. Home PT?HHA. Pt. wants to go home. Discussed with his wife who agrees.. no

## 2023-07-12 NOTE — PHYSICAL THERAPY INITIAL EVALUATION ADULT - ASSISTIVE DEVICE FOR TRANSFER: SIT/STAND, REHAB EVAL
OSN CHECKLIST    [x] UNOS CHANGED  [x] EPIC  [x] OSN TAB UPDATE  [x] EMAIL  [x] LETTER       bilateral HHA

## 2024-06-20 NOTE — H&P ADULT - ATTENDING COMMENTS
show Please see stroke note.    Today patient as resolved his language defect, and is fully able to name multiple objects and follow commands well. He's awaiting his follow-up imaging of the brain at 24 hours post TPA. As per wife, he has additional past medical history of sleep apnea, restless leg syndrome, and EtOH's abuse.    In addition patients review of systems is positive for difficulty with geographic location of his home as well as significant impairment just after one drink. Patient is not able to perform at the same functioning that he used to do so.  he is however yet to miss a day of work until this episode.    Patient  Work up with MRI brain, EEG, and if negative for multiple infarcts or hemorrhagic mammary bodies, might consider having him undergo an outpatient PET/CT to rule out a degenerative dementing condition.  as far as stroke, patient to undergo  cardiac workup.    He started on aspirin 81 mg tonight once his  follow-up scan is negative.    dysphagia screen was done and patient to be started on oral  diet.

## 2024-11-18 NOTE — ED ADULT NURSE NOTE - NS ED NURSE TRANSPORT WITH
Vianca from [facility] Dr. Ulrich office called stating that the patient called stating that Dr. Adams recommended the patient to schedule his procedure at the above mentioned  hospital for a RCA  . Office is faxing a medical record release form with the fax number.  Vianca is requesting all records.    Tel [phone]     IV pump/IV/pulse ox/cardiac monitor